# Patient Record
Sex: FEMALE | Race: WHITE | NOT HISPANIC OR LATINO | URBAN - METROPOLITAN AREA
[De-identification: names, ages, dates, MRNs, and addresses within clinical notes are randomized per-mention and may not be internally consistent; named-entity substitution may affect disease eponyms.]

---

## 2023-06-07 ENCOUNTER — TELEMEDICINE (OUTPATIENT)
Dept: PRIMARY CARE | Facility: CLINIC | Age: 48
End: 2023-06-07
Payer: COMMERCIAL

## 2023-06-07 ENCOUNTER — TELEPHONE (OUTPATIENT)
Dept: PRIMARY CARE | Facility: CLINIC | Age: 48
End: 2023-06-07

## 2023-06-07 DIAGNOSIS — J01.90 ACUTE SINUSITIS, RECURRENCE NOT SPECIFIED, UNSPECIFIED LOCATION: Primary | ICD-10-CM

## 2023-06-07 DIAGNOSIS — I10 HYPERTENSION, UNSPECIFIED TYPE: ICD-10-CM

## 2023-06-07 PROCEDURE — 99213 OFFICE O/P EST LOW 20 MIN: CPT | Performed by: FAMILY MEDICINE

## 2023-06-07 RX ORDER — MONTELUKAST SODIUM 10 MG/1
10 TABLET ORAL NIGHTLY
COMMUNITY
End: 2023-06-26 | Stop reason: SDUPTHER

## 2023-06-07 RX ORDER — ACYCLOVIR 400 MG/1
400 TABLET ORAL 2 TIMES DAILY
COMMUNITY
End: 2024-04-30 | Stop reason: SDUPTHER

## 2023-06-07 RX ORDER — OLMESARTAN MEDOXOMIL 20 MG/1
20 TABLET ORAL DAILY
COMMUNITY
End: 2023-06-07 | Stop reason: SDUPTHER

## 2023-06-07 RX ORDER — METFORMIN HYDROCHLORIDE 500 MG/1
500 TABLET ORAL
COMMUNITY
End: 2023-06-26 | Stop reason: SDUPTHER

## 2023-06-07 RX ORDER — AZITHROMYCIN 250 MG/1
TABLET, FILM COATED ORAL
Qty: 6 TABLET | Refills: 0 | Status: SHIPPED | OUTPATIENT
Start: 2023-06-07 | End: 2023-06-26 | Stop reason: ALTCHOICE

## 2023-06-07 RX ORDER — OLMESARTAN MEDOXOMIL 20 MG/1
20 TABLET ORAL DAILY
Qty: 30 TABLET | Refills: 0 | Status: SHIPPED | OUTPATIENT
Start: 2023-06-07 | End: 2023-06-26 | Stop reason: SDUPTHER

## 2023-06-07 RX ORDER — OMEPRAZOLE 40 MG/1
1 CAPSULE, DELAYED RELEASE ORAL DAILY
COMMUNITY
Start: 2022-07-11 | End: 2024-04-30 | Stop reason: SDUPTHER

## 2023-06-07 RX ORDER — GEMFIBROZIL 600 MG/1
600 TABLET, FILM COATED ORAL 2 TIMES DAILY
COMMUNITY
End: 2023-06-26 | Stop reason: SDUPTHER

## 2023-06-07 ASSESSMENT — ENCOUNTER SYMPTOMS
HEMOPTYSIS: 0
MYALGIAS: 0
FEVER: 0
SWEATS: 0
WHEEZING: 1
HEADACHES: 1
WEIGHT LOSS: 0
RHINORRHEA: 1
CHILLS: 0
COUGH: 1
SHORTNESS OF BREATH: 0
SORE THROAT: 1
HEARTBURN: 0

## 2023-06-07 NOTE — TELEPHONE ENCOUNTER
Please contact pt, she will need an in office appt for her DM and HTN, anytime after 10 days from now due to illness.    Please also let pt know that Diflucan and a Zpak are not recc together. Should she develop a yeast infection, I would recc OTC Monistat 3 day treatment,    Thank you,  Guillermina Vu, DO

## 2023-06-07 NOTE — PROGRESS NOTES
Subjective   Patient ID: Annabel Quiñonez is a 47 y.o. female who presents for No chief complaint on file..    Virtual or Telephone Consent    An interactive audio and video telecommunication system which permits real time communications between the patient (at the originating site) and provider (at the distant site) was utilized to provide this telehealth service.   Verbal consent was requested and obtained from Annabel Quiñonez on this date, 06/07/23 for a telehealth visit.       Cough  The current episode started in the past 7 days. The problem has been gradually worsening. The problem occurs every few minutes. The cough is Productive of sputum. Associated symptoms include ear congestion, headaches, nasal congestion, postnasal drip, rhinorrhea, a sore throat and wheezing. Pertinent negatives include no chest pain, chills, ear pain, fever, heartburn, hemoptysis, myalgias, rash, shortness of breath, sweats or weight loss. The symptoms are aggravated by lying down.        Review of Systems   Constitutional:  Negative for chills, fever and weight loss.   HENT:  Positive for postnasal drip, rhinorrhea and sore throat. Negative for ear pain.    Respiratory:  Positive for cough and wheezing. Negative for hemoptysis and shortness of breath.    Cardiovascular:  Negative for chest pain.   Gastrointestinal:  Negative for heartburn.   Musculoskeletal:  Negative for myalgias.   Skin:  Negative for rash.   Neurological:  Positive for headaches.       Objective   There were no vitals taken for this visit.    Physical Exam  Constitutional:       Appearance: Normal appearance.   HENT:      Nose: Congestion and rhinorrhea present.   Pulmonary:      Effort: Pulmonary effort is normal.      Breath sounds: Normal breath sounds.   Neurological:      Mental Status: She is alert.       Visit duration: 12 min   Reviewed and updated medication list and allergies   Assessment/Plan   Diagnoses and all orders for this visit:  Acute sinusitis,  recurrence not specified, unspecified location  -     azithromycin (Zithromax) 250 mg tablet; Take 2 tabs (500 mg) by mouth today, than 1 daily for 4 days.  Hypertension, unspecified type  -     olmesartan (BENIcar) 20 mg tablet; Take 1 tablet (20 mg) by mouth once daily.    Discussed R/B/SE of antibiotics  Recc OTC Yeast infection med, Monistat 3 day with symptoms  Recc continued use of HBP symptomatic cough and cold meds  Advised pt to call with any new or worsening symptoms    Guillermina MARISELA Vu,

## 2023-06-23 PROBLEM — E78.5 HYPERLIPIDEMIA: Status: ACTIVE | Noted: 2023-06-23

## 2023-06-23 PROBLEM — J45.909 ASTHMA (HHS-HCC): Status: ACTIVE | Noted: 2023-06-23

## 2023-06-23 PROBLEM — A60.00 GENITAL HERPES: Status: ACTIVE | Noted: 2023-06-23

## 2023-06-23 PROBLEM — Z90.721 STATUS POST HYSTERECTOMY WITH OOPHORECTOMY: Status: ACTIVE | Noted: 2023-06-23

## 2023-06-23 PROBLEM — I10 BENIGN ESSENTIAL HYPERTENSION: Chronic | Status: ACTIVE | Noted: 2023-06-23

## 2023-06-23 PROBLEM — K66.0 INTRAPERITONEAL ADHESIONS: Status: ACTIVE | Noted: 2023-06-23

## 2023-06-23 PROBLEM — G47.00 INSOMNIA: Status: ACTIVE | Noted: 2023-06-23

## 2023-06-23 PROBLEM — Z90.710 STATUS POST HYSTERECTOMY WITH OOPHORECTOMY: Status: ACTIVE | Noted: 2023-06-23

## 2023-06-23 PROBLEM — J10.1 INFLUENZA DUE TO INFLUENZA A VIRUS: Status: ACTIVE | Noted: 2023-06-23

## 2023-06-23 PROBLEM — K21.9 GERD (GASTROESOPHAGEAL REFLUX DISEASE): Status: ACTIVE | Noted: 2023-06-23

## 2023-06-23 PROBLEM — E11.9 CONTROLLED DIABETES MELLITUS (MULTI): Status: ACTIVE | Noted: 2023-06-23

## 2023-06-26 ENCOUNTER — OFFICE VISIT (OUTPATIENT)
Dept: PRIMARY CARE | Facility: CLINIC | Age: 48
End: 2023-06-26
Payer: COMMERCIAL

## 2023-06-26 ENCOUNTER — LAB (OUTPATIENT)
Dept: LAB | Facility: LAB | Age: 48
End: 2023-06-26
Payer: COMMERCIAL

## 2023-06-26 VITALS — BODY MASS INDEX: 34.3 KG/M2 | SYSTOLIC BLOOD PRESSURE: 126 MMHG | WEIGHT: 219 LBS | DIASTOLIC BLOOD PRESSURE: 80 MMHG

## 2023-06-26 DIAGNOSIS — J45.20 MILD INTERMITTENT ASTHMA, UNSPECIFIED WHETHER COMPLICATED (HHS-HCC): ICD-10-CM

## 2023-06-26 DIAGNOSIS — E78.1 HYPERTRIGLYCERIDEMIA: Chronic | ICD-10-CM

## 2023-06-26 DIAGNOSIS — E11.9 CONTROLLED TYPE 2 DIABETES MELLITUS WITHOUT COMPLICATION, WITHOUT LONG-TERM CURRENT USE OF INSULIN (MULTI): Primary | ICD-10-CM

## 2023-06-26 DIAGNOSIS — I10 HYPERTENSION, UNSPECIFIED TYPE: ICD-10-CM

## 2023-06-26 DIAGNOSIS — E11.9 CONTROLLED TYPE 2 DIABETES MELLITUS WITHOUT COMPLICATION, WITHOUT LONG-TERM CURRENT USE OF INSULIN (MULTI): ICD-10-CM

## 2023-06-26 DIAGNOSIS — Z12.11 COLON CANCER SCREENING: ICD-10-CM

## 2023-06-26 LAB
ALANINE AMINOTRANSFERASE (SGPT) (U/L) IN SER/PLAS: 22 U/L (ref 7–45)
ALBUMIN (G/DL) IN SER/PLAS: 4.4 G/DL (ref 3.4–5)
ALKALINE PHOSPHATASE (U/L) IN SER/PLAS: 37 U/L (ref 33–110)
ANION GAP IN SER/PLAS: 12 MMOL/L (ref 10–20)
ASPARTATE AMINOTRANSFERASE (SGOT) (U/L) IN SER/PLAS: 22 U/L (ref 9–39)
BILIRUBIN TOTAL (MG/DL) IN SER/PLAS: 0.5 MG/DL (ref 0–1.2)
CALCIUM (MG/DL) IN SER/PLAS: 9.4 MG/DL (ref 8.6–10.3)
CARBON DIOXIDE, TOTAL (MMOL/L) IN SER/PLAS: 27 MMOL/L (ref 21–32)
CHLORIDE (MMOL/L) IN SER/PLAS: 102 MMOL/L (ref 98–107)
CHOLESTEROL (MG/DL) IN SER/PLAS: 189 MG/DL (ref 0–199)
CHOLESTEROL IN HDL (MG/DL) IN SER/PLAS: 32.2 MG/DL
CHOLESTEROL/HDL RATIO: 5.9
CREATININE (MG/DL) IN SER/PLAS: 0.71 MG/DL (ref 0.5–1.05)
ESTIMATED AVERAGE GLUCOSE FOR HBA1C: 131 MG/DL
GFR FEMALE: >90 ML/MIN/1.73M2
GLUCOSE (MG/DL) IN SER/PLAS: 97 MG/DL (ref 74–99)
HEMOGLOBIN A1C/HEMOGLOBIN TOTAL IN BLOOD: 6.2 %
LDL: ABNORMAL MG/DL (ref 0–99)
POTASSIUM (MMOL/L) IN SER/PLAS: 4.3 MMOL/L (ref 3.5–5.3)
PROTEIN TOTAL: 7.4 G/DL (ref 6.4–8.2)
SODIUM (MMOL/L) IN SER/PLAS: 137 MMOL/L (ref 136–145)
TRIGLYCERIDE (MG/DL) IN SER/PLAS: 660 MG/DL (ref 0–149)
UREA NITROGEN (MG/DL) IN SER/PLAS: 9 MG/DL (ref 6–23)
VLDL: ABNORMAL MG/DL (ref 0–40)

## 2023-06-26 PROCEDURE — 80061 LIPID PANEL: CPT

## 2023-06-26 PROCEDURE — 83036 HEMOGLOBIN GLYCOSYLATED A1C: CPT

## 2023-06-26 PROCEDURE — 3074F SYST BP LT 130 MM HG: CPT | Performed by: FAMILY MEDICINE

## 2023-06-26 PROCEDURE — 99214 OFFICE O/P EST MOD 30 MIN: CPT | Performed by: FAMILY MEDICINE

## 2023-06-26 PROCEDURE — 36415 COLL VENOUS BLD VENIPUNCTURE: CPT

## 2023-06-26 PROCEDURE — 3079F DIAST BP 80-89 MM HG: CPT | Performed by: FAMILY MEDICINE

## 2023-06-26 PROCEDURE — 4010F ACE/ARB THERAPY RXD/TAKEN: CPT | Performed by: FAMILY MEDICINE

## 2023-06-26 PROCEDURE — 80053 COMPREHEN METABOLIC PANEL: CPT

## 2023-06-26 RX ORDER — GEMFIBROZIL 600 MG/1
600 TABLET, FILM COATED ORAL 2 TIMES DAILY
Qty: 60 TABLET | Refills: 11 | Status: SHIPPED | OUTPATIENT
Start: 2023-06-26 | End: 2024-04-30 | Stop reason: SDUPTHER

## 2023-06-26 RX ORDER — OLMESARTAN MEDOXOMIL 20 MG/1
20 TABLET ORAL DAILY
Qty: 90 TABLET | Refills: 3 | Status: SHIPPED | OUTPATIENT
Start: 2023-06-26 | End: 2024-04-30 | Stop reason: SDUPTHER

## 2023-06-26 RX ORDER — MONTELUKAST SODIUM 10 MG/1
10 TABLET ORAL NIGHTLY
Qty: 30 TABLET | Refills: 5 | Status: SHIPPED | OUTPATIENT
Start: 2023-06-26

## 2023-06-26 RX ORDER — METFORMIN HYDROCHLORIDE 500 MG/1
500 TABLET ORAL
Qty: 90 TABLET | Refills: 3 | Status: SHIPPED | OUTPATIENT
Start: 2023-06-26 | End: 2024-04-30 | Stop reason: SDUPTHER

## 2023-06-26 ASSESSMENT — ENCOUNTER SYMPTOMS
CONSTITUTIONAL NEGATIVE: 1
RESPIRATORY NEGATIVE: 1
MUSCULOSKELETAL NEGATIVE: 1
CARDIOVASCULAR NEGATIVE: 1

## 2023-06-26 ASSESSMENT — PATIENT HEALTH QUESTIONNAIRE - PHQ9
2. FEELING DOWN, DEPRESSED OR HOPELESS: NOT AT ALL
1. LITTLE INTEREST OR PLEASURE IN DOING THINGS: NOT AT ALL
SUM OF ALL RESPONSES TO PHQ9 QUESTIONS 1 AND 2: 0

## 2023-06-26 NOTE — PROGRESS NOTES
Subjective   Patient ID: Annabel Quiñonez is a 47 y.o. female who presents for Follow-up (Yearly follow up).    Pt presents for follow up:     DM  Is due for labs  Needs refills on meds  Grandmother has DM, Mother as well  She feels that she has gained weight   UTD on annual eye exams        HTN  BP is stable  Denies chest pain, pressure   No regular exercise    History of Bronchitis  On Singulair  No seasonal allergy symptoms    Kids are 13,16 and 21         Review of Systems   Constitutional: Negative.    Respiratory: Negative.     Cardiovascular: Negative.    Genitourinary: Negative.    Musculoskeletal: Negative.        Objective   /80   Wt 99.3 kg (219 lb)   BMI 34.30 kg/m²     Physical Exam  Constitutional:       Appearance: Normal appearance.   Cardiovascular:      Rate and Rhythm: Normal rate and regular rhythm.      Pulses:           Dorsalis pedis pulses are 2+ on the right side and 2+ on the left side.        Posterior tibial pulses are 2+ on the right side and 2+ on the left side.      Heart sounds: Normal heart sounds.   Pulmonary:      Effort: Pulmonary effort is normal.      Breath sounds: Normal breath sounds.   Abdominal:      General: Abdomen is flat. Bowel sounds are normal.      Palpations: Abdomen is soft.   Feet:      Right foot:      Protective Sensation: 5 sites tested.  5 sites sensed.      Skin integrity: Skin integrity normal.      Toenail Condition: Right toenails are normal.      Left foot:      Protective Sensation: 5 sites tested.  5 sites sensed.      Skin integrity: Skin integrity normal.      Toenail Condition: Left toenails are normal.   Neurological:      Mental Status: She is alert.         Assessment/Plan   Diagnoses and all orders for this visit:  Controlled type 2 diabetes mellitus without complication, without long-term current use of insulin (CMS/[facility])  -     Comprehensive metabolic panel; Future  -     Hemoglobin A1c; Future  -     metFORMIN (Glucophage) 500 mg tablet;  Take 1 tablet (500 mg) by mouth once daily with a meal. Take with food.  -     Lipid Panel; Future  Hypertension, unspecified type  -     olmesartan (BENIcar) 20 mg tablet; Take 1 tablet (20 mg) by mouth once daily.  Hypertriglyceridemia  -     gemfibrozil (Lopid) 600 mg tablet; Take 1 tablet (600 mg) by mouth 2 times a day.  Mild intermittent asthma, unspecified whether complicated  -     montelukast (Singulair) 10 mg tablet; Take 1 tablet (10 mg) by mouth once daily at bedtime.  Colon cancer screening  -     Referral to Gastroenterology; Future      Pt follows with GYN  Discussed the importance of a healthy diet, we discussed the possibility of nutrition referral  Discussed the importance of a regular exercise regimen, pt currently does not exercise   Follow up pending lab results  Guillermina Vu, DO

## 2023-06-27 LAB — CHOLESTEROL IN LDL (MG/DL) IN SER/PLAS BY DIRECT ASSAY: 63 MG/DL (ref 0–129)

## 2023-06-27 PROCEDURE — 83721 ASSAY OF BLOOD LIPOPROTEIN: CPT

## 2023-06-29 ENCOUNTER — TELEPHONE (OUTPATIENT)
Dept: PRIMARY CARE | Facility: CLINIC | Age: 48
End: 2023-06-29
Payer: COMMERCIAL

## 2023-06-29 DIAGNOSIS — E78.1 HYPERTRIGLYCERIDEMIA: Primary | Chronic | ICD-10-CM

## 2023-06-29 NOTE — TELEPHONE ENCOUNTER
Please notify pt that her 3 month blood sugar average is at 6.2, but her triglycerides are very elevated at 660. Please verify if she has been taking her Gemfibrozil?    Thank you,  Guillermina Vu, DO

## 2023-06-29 NOTE — TELEPHONE ENCOUNTER
Discussed with patient. Patient stated that she does not take them everyday but she does still take it. Patient stated that last year they were at 1400.

## 2024-01-13 DIAGNOSIS — J45.20 MILD INTERMITTENT ASTHMA, UNSPECIFIED WHETHER COMPLICATED (HHS-HCC): ICD-10-CM

## 2024-01-15 RX ORDER — MONTELUKAST SODIUM 10 MG/1
10 TABLET ORAL NIGHTLY
Qty: 30 TABLET | Refills: 0 | OUTPATIENT
Start: 2024-01-15

## 2024-04-30 ENCOUNTER — OFFICE VISIT (OUTPATIENT)
Dept: PRIMARY CARE | Facility: CLINIC | Age: 49
End: 2024-04-30
Payer: COMMERCIAL

## 2024-04-30 VITALS
SYSTOLIC BLOOD PRESSURE: 140 MMHG | DIASTOLIC BLOOD PRESSURE: 80 MMHG | WEIGHT: 217 LBS | BODY MASS INDEX: 33.99 KG/M2 | TEMPERATURE: 98.6 F

## 2024-04-30 DIAGNOSIS — A60.00 GENITAL HERPES SIMPLEX, UNSPECIFIED SITE: ICD-10-CM

## 2024-04-30 DIAGNOSIS — K21.9 GASTROESOPHAGEAL REFLUX DISEASE, UNSPECIFIED WHETHER ESOPHAGITIS PRESENT: ICD-10-CM

## 2024-04-30 DIAGNOSIS — R59.0 LAD (LYMPHADENOPATHY) OF LEFT CERVICAL REGION: ICD-10-CM

## 2024-04-30 DIAGNOSIS — Z12.31 ENCOUNTER FOR SCREENING MAMMOGRAM FOR MALIGNANT NEOPLASM OF BREAST: Primary | ICD-10-CM

## 2024-04-30 DIAGNOSIS — E78.1 HYPERTRIGLYCERIDEMIA: Chronic | ICD-10-CM

## 2024-04-30 DIAGNOSIS — I10 HYPERTENSION, UNSPECIFIED TYPE: ICD-10-CM

## 2024-04-30 DIAGNOSIS — E11.9 CONTROLLED TYPE 2 DIABETES MELLITUS WITHOUT COMPLICATION, WITHOUT LONG-TERM CURRENT USE OF INSULIN (MULTI): ICD-10-CM

## 2024-04-30 PROCEDURE — 3077F SYST BP >= 140 MM HG: CPT | Performed by: FAMILY MEDICINE

## 2024-04-30 PROCEDURE — 4010F ACE/ARB THERAPY RXD/TAKEN: CPT | Performed by: FAMILY MEDICINE

## 2024-04-30 PROCEDURE — 99213 OFFICE O/P EST LOW 20 MIN: CPT | Performed by: FAMILY MEDICINE

## 2024-04-30 PROCEDURE — 1036F TOBACCO NON-USER: CPT | Performed by: FAMILY MEDICINE

## 2024-04-30 PROCEDURE — 3079F DIAST BP 80-89 MM HG: CPT | Performed by: FAMILY MEDICINE

## 2024-04-30 RX ORDER — METFORMIN HYDROCHLORIDE 500 MG/1
500 TABLET ORAL
Qty: 90 TABLET | Refills: 3 | Status: SHIPPED | OUTPATIENT
Start: 2024-04-30

## 2024-04-30 RX ORDER — GEMFIBROZIL 600 MG/1
600 TABLET, FILM COATED ORAL 2 TIMES DAILY
Qty: 60 TABLET | Refills: 3 | Status: SHIPPED | OUTPATIENT
Start: 2024-04-30

## 2024-04-30 RX ORDER — ACYCLOVIR 400 MG/1
400 TABLET ORAL 2 TIMES DAILY
Qty: 30 TABLET | Refills: 0 | Status: SHIPPED | OUTPATIENT
Start: 2024-04-30

## 2024-04-30 RX ORDER — OLMESARTAN MEDOXOMIL 20 MG/1
20 TABLET ORAL DAILY
Qty: 90 TABLET | Refills: 3 | Status: SHIPPED | OUTPATIENT
Start: 2024-04-30

## 2024-04-30 RX ORDER — OMEPRAZOLE 40 MG/1
40 CAPSULE, DELAYED RELEASE ORAL DAILY
Qty: 30 CAPSULE | Refills: 3 | Status: SHIPPED | OUTPATIENT
Start: 2024-04-30

## 2024-04-30 ASSESSMENT — ENCOUNTER SYMPTOMS
RESPIRATORY NEGATIVE: 1
CONSTITUTIONAL NEGATIVE: 1
CARDIOVASCULAR NEGATIVE: 1
GASTROINTESTINAL NEGATIVE: 1

## 2024-04-30 NOTE — PROGRESS NOTES
Subjective   Patient ID: Leigh Ann Quiñonez is a 48 y.o. female who presents for Med Refill.    Pt presents for med refills    1) left anterior neck  Has noted an enlarged lymph node    2) DM  Needs med refills    3) HTN  Needs med refills    4) genital herpes  Needs med refills    5) GERD  PRN Prilosec, needs med refill           Review of Systems   Constitutional: Negative.    Respiratory: Negative.     Cardiovascular: Negative.    Gastrointestinal: Negative.        Objective   /80 (BP Location: Right arm, Patient Position: Sitting)   Temp 37 °C (98.6 °F)   Wt 98.4 kg (217 lb)   BMI 33.99 kg/m²     Physical Exam  Vitals reviewed.   Constitutional:       Appearance: Normal appearance.   Neck:        Comments: Left anterior single cervical node, noted to be enlarged, non tender to palpation  Cardiovascular:      Rate and Rhythm: Normal rate and regular rhythm.      Heart sounds: Normal heart sounds.   Pulmonary:      Breath sounds: Normal breath sounds.   Abdominal:      General: Bowel sounds are normal. There is no distension.      Palpations: Abdomen is soft. There is no mass.      Tenderness: There is no abdominal tenderness. There is no guarding.      Hernia: No hernia is present.   Musculoskeletal:      Right lower leg: No edema.      Left lower leg: No edema.   Skin:     General: Skin is warm and dry.   Neurological:      Mental Status: She is alert.         Assessment/Plan   Diagnoses and all orders for this visit:  Encounter for screening mammogram for malignant neoplasm of breast  -     BI mammo bilateral screening tomosynthesis; Future  Hypertension, unspecified type  -     olmesartan (BENIcar) 20 mg tablet; Take 1 tablet (20 mg) by mouth once daily.  Controlled type 2 diabetes mellitus without complication, without long-term current use of insulin ([facility])  -     metFORMIN (Glucophage) 500 mg tablet; Take 1 tablet (500 mg) by mouth once daily with breakfast. Take with food.  -     Hemoglobin A1c;  Future  -     Comprehensive metabolic panel; Future  Hypertriglyceridemia  -     gemfibrozil (Lopid) 600 mg tablet; Take 1 tablet (600 mg) by mouth 2 times a day.  Genital herpes simplex, unspecified site  -     acyclovir (Zovirax) 400 mg tablet; Take 1 tablet (400 mg) by mouth 2 times a day.  LAD (lymphadenopathy) of left cervical region  -     Referral to ENT; Future  Gastroesophageal reflux disease, unspecified whether esophagitis present  -     omeprazole (PriLOSEC) 40 mg DR capsule; Take 1 capsule (40 mg) by mouth once daily.      Track BP at home, if greater than 140/90 please call the office  Monitor enlarged lymph node, if not decreasing, recc ENT evaluation  Guillermina Vu, DO

## 2024-05-13 ENCOUNTER — APPOINTMENT (OUTPATIENT)
Dept: PRIMARY CARE | Facility: CLINIC | Age: 49
End: 2024-05-13
Payer: COMMERCIAL

## 2024-05-14 ENCOUNTER — OFFICE VISIT (OUTPATIENT)
Dept: OTOLARYNGOLOGY | Facility: HOSPITAL | Age: 49
End: 2024-05-14
Payer: COMMERCIAL

## 2024-05-14 VITALS — WEIGHT: 213.9 LBS | TEMPERATURE: 95.5 F | HEIGHT: 67 IN | BODY MASS INDEX: 33.57 KG/M2

## 2024-05-14 DIAGNOSIS — R59.0 LAD (LYMPHADENOPATHY) OF LEFT CERVICAL REGION: ICD-10-CM

## 2024-05-14 PROCEDURE — 99214 OFFICE O/P EST MOD 30 MIN: CPT | Performed by: STUDENT IN AN ORGANIZED HEALTH CARE EDUCATION/TRAINING PROGRAM

## 2024-05-14 PROCEDURE — 4010F ACE/ARB THERAPY RXD/TAKEN: CPT | Performed by: STUDENT IN AN ORGANIZED HEALTH CARE EDUCATION/TRAINING PROGRAM

## 2024-05-14 PROCEDURE — 1036F TOBACCO NON-USER: CPT | Performed by: STUDENT IN AN ORGANIZED HEALTH CARE EDUCATION/TRAINING PROGRAM

## 2024-05-14 PROCEDURE — 99204 OFFICE O/P NEW MOD 45 MIN: CPT | Performed by: STUDENT IN AN ORGANIZED HEALTH CARE EDUCATION/TRAINING PROGRAM

## 2024-05-14 ASSESSMENT — PATIENT HEALTH QUESTIONNAIRE - PHQ9
2. FEELING DOWN, DEPRESSED OR HOPELESS: NOT AT ALL
1. LITTLE INTEREST OR PLEASURE IN DOING THINGS: NOT AT ALL
SUM OF ALL RESPONSES TO PHQ9 QUESTIONS 1 & 2: 0

## 2024-05-14 NOTE — PROGRESS NOTES
"Chief Complaint:    Chief Complaint   Patient presents with    Follow-up     Mass on neck left side        History of Present Illness:  48 y.o. female presents to [facility] on 05/14/24 with left neck lymphadenopathy.  Around Easter time, the patient reports that she was feeling her neck and found a \"lump\" in the left lateral aspect of her neck.  She thought the other side and noted that this was not present on the right.  Otherwise, she is completely asymptomatic from the lesion.  She denies a history of salivary issues.  Of note, the patient does have a history of a skin cancer just along the radix of the nose.  It sounds like this was excised over 5 years ago.    Past Medical History  No past medical history on file.    Past Surgical History  No past surgical history on file.    Social History  Social History     Socioeconomic History    Marital status:      Spouse name: Not on file    Number of children: Not on file    Years of education: Not on file    Highest education level: Not on file   Occupational History    Not on file   Tobacco Use    Smoking status: Never     Passive exposure: Never    Smokeless tobacco: Never   Substance and Sexual Activity    Alcohol use: Not Currently    Drug use: Never    Sexual activity: Not on file   Other Topics Concern    Not on file   Social History Narrative    Not on file     Social Determinants of Health     Financial Resource Strain: Not on file   Food Insecurity: Not on file   Transportation Needs: Not on file   Physical Activity: Not on file   Stress: Not on file   Social Connections: Not on file   Intimate Partner Violence: Not on file   Housing Stability: Not on file       Family History  Family History   Problem Relation Name Age of Onset    No Known Problems Mother      No Known Problems Father         Medications:  Current Outpatient Medications   Medication Sig Dispense Refill    acyclovir (Zovirax) 400 mg tablet Take 1 " "tablet (400 mg) by mouth 2 times a day. 30 tablet 0    gemfibrozil (Lopid) 600 mg tablet Take 1 tablet (600 mg) by mouth 2 times a day. 60 tablet 3    metFORMIN (Glucophage) 500 mg tablet Take 1 tablet (500 mg) by mouth once daily with breakfast. Take with food. 90 tablet 3    montelukast (Singulair) 10 mg tablet Take 1 tablet (10 mg) by mouth once daily at bedtime. 30 tablet 5    olmesartan (BENIcar) 20 mg tablet Take 1 tablet (20 mg) by mouth once daily. 90 tablet 3    omeprazole (PriLOSEC) 40 mg DR capsule Take 1 capsule (40 mg) by mouth once daily. 30 capsule 3     No current facility-administered medications for this visit.       Allergies: Doxycycline, Hydromorphone, Oxycodone-acetaminophen, Penicillins, and Sulfa (sulfonamide antibiotics)    Immunizations:   Immunization History   Administered Date(s) Administered    Influenza, Unspecified 10/31/2011, 10/12/2012, 10/05/2015    Moderna COVID-19 vaccine, bivalent, blue cap/gray label *Check age/dose* 03/09/2023    Moderna SARS-CoV-2 Vaccination 03/15/2021, 04/14/2021, 11/26/2021        Review of Systems:  Constitutional: Negative for fever, weight loss and weight gain  HENT: Negative for ear pain, sore throat and hoarseness.  Negative for difficulty swallowing  Cardiovascular: Negative for chest pain and dyspnea on exertion (Can climb up 2 floors)  Respiratory: Is not experiencing shortness of breath  Gastrointestinal: Negative for nausea and vomiting  Neurological: Negative for headaches.   Psychiatric: The patient is not nervous/anxious   Musculoskeletal: Denies muscle pain/weakness  Heme/Lymph: Negative for lymph nodes, easy bruising    Physical Exam  Vital Signs:  Temp 35.3 °C (95.5 °F)   Ht 1.702 m (5' 7\")   Wt 97 kg (213 lb 14.4 oz)   BMI 33.50 kg/m²     General:  Well-developed, well-nourished. No distress.  Overweight female  Communication and Voice:  Clear pitch and clarity  Respiratory  Respiratory effort:  Equal inspiration and expiration without " stridor  Cardiovascular  Peripheral Vascular:  Warm extremities with equal pulses  Neuro: Patient oriented to person, place, and time;  Appropriate mood and affect;  Gait is intact with no imbalance; Cranial nerves II-XII are intact  Head and Face  Inspection:  Normocephalic and atraumatic without mass or lesion  Palpation:  Facial skeleton intact without bony stepoffs  Facial Strength:  Facial motility symmetric and full bilaterally  Eyes: PERRLA. No nystagmus with normal extraocular motion bilaterally  ENT  Pinna:  External ear intact and fully developed  External canal:  Canal is patent with intact skin  Tympanic Membrane:  Clear and mobile  External nose:  No scar or anatomic deformity  Internal Nose:  Septum intact and midline.  No edema, polyp, or rhinorrhea.  TMJ:  No pain to palpation with full mobility  Salivary Glands:  No mass or tenderness  Lips: No lesion.  Oral cavity: No mass or lesion.  Oropharynx:  No mass or lesion. Tonsillar fossa symmetric. Base of tongue soft without mass or induration  Neck  Trachea:  Midline trachea.  Thyroid:  No mass or nodularity  Lymphatics: I do feel an approximately 2 cm mass just inferior to the left submandibular gland.  This feels separate from the submandibular gland.  There is no overlying skin changes or pain with palpation     Impression/Plan:  48 y.o. female presents to [facility] on 05/14/24 with left neck lymphadenopathy.    -We will start by obtaining an ultrasound of the left lateral neck.  Based on ultrasound results, we may consider other imaging including a CT neck versus biopsy  -The patient does have a history of a skin cancer on the nose.  I do not know whether this was a basal cell or squamous cell.  -I will talk to the patient once ultrasound is done, ideally this will be back before my departure

## 2024-05-24 ENCOUNTER — TELEPHONE (OUTPATIENT)
Dept: PRIMARY CARE | Facility: CLINIC | Age: 49
End: 2024-05-24

## 2024-05-24 ENCOUNTER — LAB (OUTPATIENT)
Dept: LAB | Facility: LAB | Age: 49
End: 2024-05-24
Payer: COMMERCIAL

## 2024-05-24 ENCOUNTER — HOSPITAL ENCOUNTER (OUTPATIENT)
Dept: RADIOLOGY | Facility: CLINIC | Age: 49
Discharge: HOME | End: 2024-05-24
Payer: COMMERCIAL

## 2024-05-24 VITALS — BODY MASS INDEX: 33.56 KG/M2 | WEIGHT: 213.85 LBS | HEIGHT: 67 IN

## 2024-05-24 DIAGNOSIS — R59.0 LAD (LYMPHADENOPATHY) OF LEFT CERVICAL REGION: ICD-10-CM

## 2024-05-24 DIAGNOSIS — R92.8 ABNORMAL MAMMOGRAM: Primary | ICD-10-CM

## 2024-05-24 DIAGNOSIS — E78.1 HYPERTRIGLYCERIDEMIA: Chronic | ICD-10-CM

## 2024-05-24 DIAGNOSIS — Z12.31 ENCOUNTER FOR SCREENING MAMMOGRAM FOR MALIGNANT NEOPLASM OF BREAST: ICD-10-CM

## 2024-05-24 DIAGNOSIS — E11.9 CONTROLLED TYPE 2 DIABETES MELLITUS WITHOUT COMPLICATION, WITHOUT LONG-TERM CURRENT USE OF INSULIN (MULTI): ICD-10-CM

## 2024-05-24 LAB
ALBUMIN SERPL BCP-MCNC: 4.2 G/DL (ref 3.4–5)
ALP SERPL-CCNC: 42 U/L (ref 33–110)
ALT SERPL W P-5'-P-CCNC: 22 U/L (ref 7–45)
ANION GAP SERPL CALC-SCNC: 13 MMOL/L (ref 10–20)
AST SERPL W P-5'-P-CCNC: 18 U/L (ref 9–39)
BILIRUB SERPL-MCNC: 0.5 MG/DL (ref 0–1.2)
BUN SERPL-MCNC: 10 MG/DL (ref 6–23)
CALCIUM SERPL-MCNC: 9.2 MG/DL (ref 8.6–10.6)
CHLORIDE SERPL-SCNC: 104 MMOL/L (ref 98–107)
CHOLEST SERPL-MCNC: 221 MG/DL (ref 0–199)
CHOLESTEROL/HDL RATIO: 7.5
CO2 SERPL-SCNC: 25 MMOL/L (ref 21–32)
CREAT SERPL-MCNC: 0.82 MG/DL (ref 0.5–1.05)
EGFRCR SERPLBLD CKD-EPI 2021: 88 ML/MIN/1.73M*2
EST. AVERAGE GLUCOSE BLD GHB EST-MCNC: 126 MG/DL
GLUCOSE SERPL-MCNC: 112 MG/DL (ref 74–99)
HBA1C MFR BLD: 6 %
HDLC SERPL-MCNC: 29.5 MG/DL
LDLC SERPL CALC-MCNC: ABNORMAL MG/DL
NON HDL CHOLESTEROL: 192 MG/DL (ref 0–149)
POTASSIUM SERPL-SCNC: 4.2 MMOL/L (ref 3.5–5.3)
PROT SERPL-MCNC: 7.3 G/DL (ref 6.4–8.2)
SODIUM SERPL-SCNC: 138 MMOL/L (ref 136–145)
TRIGL SERPL-MCNC: 750 MG/DL (ref 0–149)
VLDL: ABNORMAL

## 2024-05-24 PROCEDURE — 77067 SCR MAMMO BI INCL CAD: CPT

## 2024-05-24 PROCEDURE — 80061 LIPID PANEL: CPT

## 2024-05-24 PROCEDURE — 77063 BREAST TOMOSYNTHESIS BI: CPT | Performed by: RADIOLOGY

## 2024-05-24 PROCEDURE — 76536 US EXAM OF HEAD AND NECK: CPT | Performed by: RADIOLOGY

## 2024-05-24 PROCEDURE — 76536 US EXAM OF HEAD AND NECK: CPT

## 2024-05-24 PROCEDURE — 83036 HEMOGLOBIN GLYCOSYLATED A1C: CPT

## 2024-05-24 PROCEDURE — 80053 COMPREHEN METABOLIC PANEL: CPT

## 2024-05-24 PROCEDURE — 36415 COLL VENOUS BLD VENIPUNCTURE: CPT

## 2024-05-24 PROCEDURE — 77067 SCR MAMMO BI INCL CAD: CPT | Performed by: RADIOLOGY

## 2024-05-24 NOTE — TELEPHONE ENCOUNTER
Pt is calling stating that she went to get her routine mammogram done today. Pt states that she found a lump yesterday and they still did her mammogram and stated that she would need further imaging done. Pt stated she just wanted to let you know for when you receive her results.

## 2024-05-30 ENCOUNTER — APPOINTMENT (OUTPATIENT)
Dept: OTOLARYNGOLOGY | Facility: CLINIC | Age: 49
End: 2024-05-30
Payer: COMMERCIAL

## 2024-05-31 ENCOUNTER — TELEPHONE (OUTPATIENT)
Dept: OTOLARYNGOLOGY | Facility: CLINIC | Age: 49
End: 2024-05-31
Payer: COMMERCIAL

## 2024-05-31 ENCOUNTER — TELEPHONE (OUTPATIENT)
Dept: OPERATING ROOM | Facility: CLINIC | Age: 49
End: 2024-05-31
Payer: COMMERCIAL

## 2024-05-31 DIAGNOSIS — R59.0 LAD (LYMPHADENOPATHY) OF LEFT CERVICAL REGION: ICD-10-CM

## 2024-05-31 NOTE — TELEPHONE ENCOUNTER
I called the patient about her recent US of her neck for her left neck mass showing:    FINDINGS:  There is a 1.2 x 0.3 x 1.0 cm lymph node in zone 2. No additional  space-occupying masses or fluid collections are detected.      IMPRESSION:  1.2 x 0.3 x 1.0 cm zone 2 lymph node at the site of palpable  abnormality.    Recall, the patient was seen for left neck lymphadenopathy. 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  I have low suspicion that this node is pathologic or related to her skin cancer.  We discussed options including observation with repeat ultrasound in 3 months versus an FNA of the lesion.  The patient is anxious about this lesion and would like to proceed with an FNA.  The lesion is quite small, but we will see whether IR will be able to biopsy this lesion.  Given my upcoming departure, I will have her follow-up with one of my head and neck partners.

## 2024-05-31 NOTE — TELEPHONE ENCOUNTER
Topic: Dr Singer request order of US biopsy of left neck node.    Order was placed and I call patient and gave her the phone number to schedule the biopsy. Pt instructed to call  me back and give me the date of the biopsy and then I will assist in scheduling an ENT HN appt to review the results.    Pt verbalized understanding

## 2024-06-03 ENCOUNTER — HOSPITAL ENCOUNTER (OUTPATIENT)
Dept: RADIOLOGY | Facility: EXTERNAL LOCATION | Age: 49
Discharge: HOME | End: 2024-06-03
Payer: COMMERCIAL

## 2024-06-03 NOTE — TELEPHONE ENCOUNTER
Pt needs further imaging, I placed an order for diag mammogram and US,    Thank you,  Guillermina Vu, DO

## 2024-06-04 ENCOUNTER — HOSPITAL ENCOUNTER (OUTPATIENT)
Dept: RADIOLOGY | Facility: CLINIC | Age: 49
Discharge: HOME | End: 2024-06-04
Payer: COMMERCIAL

## 2024-06-04 ENCOUNTER — TELEPHONE (OUTPATIENT)
Dept: OTOLARYNGOLOGY | Facility: HOSPITAL | Age: 49
End: 2024-06-04
Payer: COMMERCIAL

## 2024-06-04 DIAGNOSIS — R92.8 ABNORMAL MAMMOGRAM: ICD-10-CM

## 2024-06-04 DIAGNOSIS — R59.0 LAD (LYMPHADENOPATHY) OF LEFT CERVICAL REGION: ICD-10-CM

## 2024-06-04 PROCEDURE — 76642 ULTRASOUND BREAST LIMITED: CPT | Mod: RT

## 2024-06-04 PROCEDURE — 76981 USE PARENCHYMA: CPT | Mod: RT

## 2024-06-04 PROCEDURE — 76642 ULTRASOUND BREAST LIMITED: CPT | Mod: RIGHT SIDE | Performed by: RADIOLOGY

## 2024-06-04 NOTE — TELEPHONE ENCOUNTER
Topic: Cancel US FNA biopsy of neck nodule      Pt called stating that she had two options when talking with Dr Singer on 5/31. The discussion was to wait and repeat neck US in 3 months or biopsy the neck nodule at this time.  Pt requested to biopsy nodule and it was scheduled for 6/10, but she is calling stating she wants to cancel the biopsy and instead repeat the Neck US in 3 months.    Dr Richard is taking for from Dr Singer. His team is aware of Dr Singer's recommendation from 5/31 and pt's request now to cancel the biopsy.  Dr Richard's nurse has placed the order for US neck in 3 months and Dr Richard's  will call patient to schedule an appt for after the US.  I have canceled the patient's US FNA biopsy that was scheduled for 6/10.    I called pt back and she is aware of that I canceled the US FNA biopsy as she requested and that the order has been placed for the repeat US in 3 months.  Pt was given the phone number to schedule the US.  Pt was also given the phone number for Dr Richard's office to call with questions or concerns.  Pt verbalized understanding of the above

## 2024-06-10 ENCOUNTER — APPOINTMENT (OUTPATIENT)
Dept: RADIOLOGY | Facility: HOSPITAL | Age: 49
End: 2024-06-10
Payer: COMMERCIAL

## 2024-09-04 ENCOUNTER — APPOINTMENT (OUTPATIENT)
Dept: RADIOLOGY | Facility: CLINIC | Age: 49
End: 2024-09-04
Payer: COMMERCIAL

## 2024-09-10 ENCOUNTER — HOSPITAL ENCOUNTER (OUTPATIENT)
Dept: RADIOLOGY | Facility: CLINIC | Age: 49
Discharge: HOME | End: 2024-09-10
Payer: COMMERCIAL

## 2024-09-10 DIAGNOSIS — R59.0 LAD (LYMPHADENOPATHY) OF LEFT CERVICAL REGION: ICD-10-CM

## 2024-09-10 PROCEDURE — 76536 US EXAM OF HEAD AND NECK: CPT

## 2024-09-10 PROCEDURE — 76536 US EXAM OF HEAD AND NECK: CPT | Performed by: RADIOLOGY

## 2024-09-11 NOTE — RESULT ENCOUNTER NOTE
Ultrasound reviewed.  No obvious concerning lymph nodes.  Some are borderline enlarged but the overall shape of the lymph nodes are normal.    Vandana will review everything at your appointment later this month.  We can discuss whether anything else is indicated after obtaining more history and reviewing everything in person

## 2024-09-13 ENCOUNTER — APPOINTMENT (OUTPATIENT)
Dept: OTOLARYNGOLOGY | Facility: CLINIC | Age: 49
End: 2024-09-13
Payer: COMMERCIAL

## 2024-09-16 DIAGNOSIS — K21.9 GASTROESOPHAGEAL REFLUX DISEASE, UNSPECIFIED WHETHER ESOPHAGITIS PRESENT: ICD-10-CM

## 2024-09-16 DIAGNOSIS — E78.1 HYPERTRIGLYCERIDEMIA: Chronic | ICD-10-CM

## 2024-09-26 NOTE — PROGRESS NOTES
"Chief Complaint:    Chief Complaint   Patient presents with    Follow-up       History of Present Illness:  48 y.o. female presents to [facility] on 09/27/24 with left neck lymphadenopathy.  Around Easter time, the patient reports that she was feeling her neck and found a \"lump\" in the left lateral aspect of her neck.  She thought the other side and noted that this was not present on the right.  Otherwise, she is completely asymptomatic from the lesion.  She denies a history of salivary issues.  Of note, the patient does have a history of a skin cancer just along the radix of the nose.  It sounds like this was excised over 5 years ago.    9/27/24: Patient returns for follow-up.  She previously saw Dr. Singer.  She recently had a repeat ultrasound which showed the left-sided lymph node is slightly larger in size.  It maintains a fatty hilum.  She has remained asymptomatic    Past Medical History  History reviewed. No pertinent past medical history.    Past Surgical History  Past Surgical History:   Procedure Laterality Date    BREAST RECONSTRUCTION Bilateral 01/01/2000 2000 bilateral breast reduction       Social History  Social History     Socioeconomic History    Marital status:      Spouse name: Not on file    Number of children: Not on file    Years of education: Not on file    Highest education level: Not on file   Occupational History    Not on file   Tobacco Use    Smoking status: Never     Passive exposure: Never    Smokeless tobacco: Never   Substance and Sexual Activity    Alcohol use: Not Currently    Drug use: Never    Sexual activity: Not on file   Other Topics Concern    Not on file   Social History Narrative    Not on file     Social Determinants of Health     Financial Resource Strain: Not on file   Food Insecurity: Not on file   Transportation Needs: Not on file   Physical Activity: Not on file   Stress: Not on file   Social Connections: Not on file "   Intimate Partner Violence: Not on file   Housing Stability: Not on file       Family History  Family History   Problem Relation Name Age of Onset    No Known Problems Mother      No Known Problems Father         Medications:  Current Outpatient Medications   Medication Sig Dispense Refill    acyclovir (Zovirax) 400 mg tablet Take 1 tablet (400 mg) by mouth 2 times a day. 30 tablet 0    gemfibrozil (Lopid) 600 mg tablet Take 1 tablet (600 mg) by mouth 2 times a day. 60 tablet 3    metFORMIN (Glucophage) 500 mg tablet Take 1 tablet (500 mg) by mouth once daily with breakfast. Take with food. 90 tablet 3    montelukast (Singulair) 10 mg tablet Take 1 tablet (10 mg) by mouth once daily at bedtime. 30 tablet 5    olmesartan (BENIcar) 20 mg tablet Take 1 tablet (20 mg) by mouth once daily. 90 tablet 3    omeprazole (PriLOSEC) 40 mg DR capsule Take 1 capsule (40 mg) by mouth once daily. 30 capsule 3     No current facility-administered medications for this visit.       Allergies: Doxycycline, Hydromorphone, Oxycodone-acetaminophen, Penicillins, and Sulfa (sulfonamide antibiotics)    Immunizations:   Immunization History   Administered Date(s) Administered    Influenza, Unspecified 10/31/2011, 10/12/2012, 10/05/2015    Moderna COVID-19 vaccine, bivalent, blue cap/gray label *Check age/dose* 03/09/2023    Moderna SARS-CoV-2 Vaccination 03/15/2021, 04/14/2021, 11/26/2021        Review of Systems:  Constitutional: Negative for fever, weight loss and weight gain  HENT: Negative for ear pain, sore throat and hoarseness.  Negative for difficulty swallowing  Cardiovascular: Negative for chest pain and dyspnea on exertion (Can climb up 2 floors)  Respiratory: Is not experiencing shortness of breath  Gastrointestinal: Negative for nausea and vomiting  Neurological: Negative for headaches.   Psychiatric: The patient is not nervous/anxious   Musculoskeletal: Denies muscle pain/weakness  Heme/Lymph: Negative for lymph nodes, easy  "bruising    Physical Exam  Vital Signs:  Ht 1.702 m (5' 7\")   Wt 96.2 kg (212 lb)   BMI 33.20 kg/m²     General:  Well-developed, well-nourished. No distress.  Overweight female  Communication and Voice:  Clear pitch and clarity  Respiratory  Respiratory effort:  Equal inspiration and expiration without stridor  Cardiovascular  Peripheral Vascular:  Warm extremities with equal pulses  Neuro: Patient oriented to person, place, and time;  Appropriate mood and affect;  Gait is intact with no imbalance; Cranial nerves II-XII are intact  Head and Face  Inspection:  Normocephalic and atraumatic without mass or lesion  Palpation:  Facial skeleton intact without bony stepoffs  Facial Strength:  Facial motility symmetric and full bilaterally  Eyes: PERRLA. No nystagmus with normal extraocular motion bilaterally  ENT  Pinna:  External ear intact and fully developed  External canal:  Canal is patent with intact skin  Tympanic Membrane:  Clear and mobile  External nose:  No scar or anatomic deformity  Internal Nose:  Septum intact and midline.  No edema, polyp, or rhinorrhea.  TMJ:  No pain to palpation with full mobility  Salivary Glands:  No mass or tenderness  Lips: No lesion.  Oral cavity: No mass or lesion.  Oropharynx:  No mass or lesion. Tonsillar fossa symmetric. Base of tongue soft without mass or induration  Neck  Trachea:  Midline trachea.  Thyroid:  No mass or nodularity  Lymphatics: I do feel an approximately 2 cm mass just inferior to the left submandibular gland.  This feels separate from the submandibular gland.  There is no overlying skin changes or pain with palpation     Procedure Note: Flexible Nasolaryngoscopy  Verbal informed consent was obtained from the patient/patient's guardian. 4% lidocaine mixed with phenylephrine was prepared and dripped into the nose. It was placed in the right naris. Following an appropriate amount of time to allow for adequate anesthesia, a flexible fiberoptic nasolaryngoscope " was placed into the patient's right naris. The nasal cavity, nasopharynx, oropharynx, hypopharynx, and all endolaryngeal structures were visualized and were normal except as listed below. Significant findings included:  -No concerning mucosal lesions  True vocal cord mobile      Impression/Plan:  48 y.o. female presents to [facility] on 05/14/24 with left neck lymphadenopathy.    -We reviewed her ultrasound in detail.  The lymph node is still present and is measured slightly larger in size.  Considering it has not resolved and is slightly grown in size would recommend image guided biopsy for lymphoma protocol.  -Follow-up after biopsy, follow-up earlier with any new concerns

## 2024-09-27 ENCOUNTER — APPOINTMENT (OUTPATIENT)
Dept: OTOLARYNGOLOGY | Facility: CLINIC | Age: 49
End: 2024-09-27
Payer: COMMERCIAL

## 2024-09-27 VITALS — BODY MASS INDEX: 33.27 KG/M2 | HEIGHT: 67 IN | WEIGHT: 212 LBS

## 2024-09-27 DIAGNOSIS — R59.0 LAD (LYMPHADENOPATHY) OF LEFT CERVICAL REGION: ICD-10-CM

## 2024-09-27 PROCEDURE — 3008F BODY MASS INDEX DOCD: CPT | Performed by: OTOLARYNGOLOGY

## 2024-09-27 PROCEDURE — 3044F HG A1C LEVEL LT 7.0%: CPT | Performed by: OTOLARYNGOLOGY

## 2024-09-27 PROCEDURE — 4010F ACE/ARB THERAPY RXD/TAKEN: CPT | Performed by: OTOLARYNGOLOGY

## 2024-09-27 PROCEDURE — 1036F TOBACCO NON-USER: CPT | Performed by: OTOLARYNGOLOGY

## 2024-09-27 PROCEDURE — 99213 OFFICE O/P EST LOW 20 MIN: CPT | Performed by: OTOLARYNGOLOGY

## 2024-09-27 ASSESSMENT — PAIN SCALES - GENERAL: PAINLEVEL: 0-NO PAIN

## 2024-10-08 RX ORDER — OMEPRAZOLE 40 MG/1
40 CAPSULE, DELAYED RELEASE ORAL DAILY
Qty: 30 CAPSULE | Refills: 0 | OUTPATIENT
Start: 2024-10-08

## 2024-10-08 RX ORDER — GEMFIBROZIL 600 MG/1
600 TABLET, FILM COATED ORAL 2 TIMES DAILY
Qty: 60 TABLET | Refills: 0 | OUTPATIENT
Start: 2024-10-08

## 2024-10-25 ENCOUNTER — HOSPITAL ENCOUNTER (OUTPATIENT)
Dept: RADIOLOGY | Facility: HOSPITAL | Age: 49
Discharge: HOME | End: 2024-10-25
Payer: COMMERCIAL

## 2024-10-25 VITALS
HEART RATE: 69 BPM | DIASTOLIC BLOOD PRESSURE: 92 MMHG | SYSTOLIC BLOOD PRESSURE: 151 MMHG | OXYGEN SATURATION: 96 % | RESPIRATION RATE: 20 BRPM

## 2024-10-25 DIAGNOSIS — R59.0 LAD (LYMPHADENOPATHY) OF LEFT CERVICAL REGION: ICD-10-CM

## 2024-10-25 PROCEDURE — 10005 FNA BX W/US GDN 1ST LES: CPT

## 2024-10-25 PROCEDURE — 2500000004 HC RX 250 GENERAL PHARMACY W/ HCPCS (ALT 636 FOR OP/ED): Performed by: RADIOLOGY

## 2024-10-25 RX ORDER — LIDOCAINE HYDROCHLORIDE 10 MG/ML
INJECTION, SOLUTION EPIDURAL; INFILTRATION; INTRACAUDAL; PERINEURAL
Status: COMPLETED | OUTPATIENT
Start: 2024-10-25 | End: 2024-10-25

## 2024-10-25 ASSESSMENT — PAIN SCALES - GENERAL
PAINLEVEL_OUTOF10: 0 - NO PAIN

## 2024-10-25 NOTE — Clinical Note
Biopsy site remains d/I with no bleeding or hematoma present. Pt denies pain,dysphagia or facialo numbness/tingling. Discharge instructions given. Verbalizes understanding. Discharged pt to waiting room.

## 2024-10-25 NOTE — Clinical Note
Left cervical lymph node biopsy complete. Samples sent to lab. Site without bleeding or hematoma. Dressed with band aid and ice pack. Pt tolerated well. Denies pain, dysphagia or facial numbness/tingling. Will cont to monitor.

## 2024-10-25 NOTE — POST-PROCEDURE NOTE
Interventional Radiology Brief Postprocedure Note    Attending: Zay Sher MD      Assistant: none    Diagnosis: Level 2 lymph node    Description of procedure: I evaluated the left neck for core biopsy, but the position of the lymph node relative to the carotid bifurcation and a thyroid vessel was concerning to me. Proceeded with FNA biopsy x 5 a sent according to the lymph node protocol as discussed with path.     Anesthesia:  Local    Complications: None    Estimated Blood Loss: minimal    Medications (Filter: Administrations occurring from 1003 to 1051 on 10/25/24) As of 10/25/24 1051      lidocaine PF (Xylocaine) 10 mg/mL (1 %) injection (mL) Total volume:  5 mL      Date/Time Rate/Dose/Volume Action       10/25/24  1028 5 mL Given                   See detailed result report with images in PACS.    The patient tolerated the procedure well without incident or complication and is in stable condition.

## 2024-10-30 ENCOUNTER — APPOINTMENT (OUTPATIENT)
Dept: PRIMARY CARE | Facility: CLINIC | Age: 49
End: 2024-10-30
Payer: COMMERCIAL

## 2024-10-30 VITALS
SYSTOLIC BLOOD PRESSURE: 142 MMHG | DIASTOLIC BLOOD PRESSURE: 90 MMHG | BODY MASS INDEX: 33.74 KG/M2 | OXYGEN SATURATION: 99 % | HEART RATE: 74 BPM | RESPIRATION RATE: 16 BRPM | TEMPERATURE: 97.6 F | HEIGHT: 67 IN | WEIGHT: 215 LBS

## 2024-10-30 DIAGNOSIS — E11.9 CONTROLLED TYPE 2 DIABETES MELLITUS WITHOUT COMPLICATION, WITHOUT LONG-TERM CURRENT USE OF INSULIN (MULTI): ICD-10-CM

## 2024-10-30 DIAGNOSIS — E78.1 HYPERTRIGLYCERIDEMIA: Chronic | ICD-10-CM

## 2024-10-30 DIAGNOSIS — I10 BENIGN ESSENTIAL HYPERTENSION: Chronic | ICD-10-CM

## 2024-10-30 DIAGNOSIS — J45.20 MILD INTERMITTENT REACTIVE AIRWAY DISEASE WITHOUT COMPLICATION (HHS-HCC): ICD-10-CM

## 2024-10-30 DIAGNOSIS — A60.00 GENITAL HERPES SIMPLEX, UNSPECIFIED SITE: ICD-10-CM

## 2024-10-30 DIAGNOSIS — R73.03 PREDIABETES: Primary | ICD-10-CM

## 2024-10-30 DIAGNOSIS — Z23 ENCOUNTER FOR IMMUNIZATION: ICD-10-CM

## 2024-10-30 DIAGNOSIS — Z12.9 SCREENING FOR MALIGNANT NEOPLASM: ICD-10-CM

## 2024-10-30 DIAGNOSIS — K92.1 HEMATOCHEZIA: ICD-10-CM

## 2024-10-30 DIAGNOSIS — Z90.710 STATUS POST HYSTERECTOMY WITH OOPHORECTOMY: ICD-10-CM

## 2024-10-30 DIAGNOSIS — J45.20 MILD INTERMITTENT ASTHMA, UNSPECIFIED WHETHER COMPLICATED (HHS-HCC): ICD-10-CM

## 2024-10-30 DIAGNOSIS — I10 HYPERTENSION, UNSPECIFIED TYPE: ICD-10-CM

## 2024-10-30 DIAGNOSIS — Z90.721 STATUS POST HYSTERECTOMY WITH OOPHORECTOMY: ICD-10-CM

## 2024-10-30 DIAGNOSIS — K21.9 GASTROESOPHAGEAL REFLUX DISEASE, UNSPECIFIED WHETHER ESOPHAGITIS PRESENT: ICD-10-CM

## 2024-10-30 LAB
BASOPHILS # BLD AUTO: 0.03 X10*3/UL (ref 0–0.1)
BASOPHILS NFR BLD AUTO: 0.5 %
CHOLEST SERPL-MCNC: 208 MG/DL (ref 0–199)
CHOLESTEROL/HDL RATIO: 6.7
EOSINOPHIL # BLD AUTO: 0.13 X10*3/UL (ref 0–0.7)
EOSINOPHIL NFR BLD AUTO: 2.1 %
ERYTHROCYTE [DISTWIDTH] IN BLOOD BY AUTOMATED COUNT: 12.8 % (ref 11.5–14.5)
EST. AVERAGE GLUCOSE BLD GHB EST-MCNC: 123 MG/DL
HBA1C MFR BLD: 5.9 %
HCT VFR BLD AUTO: 39.4 % (ref 36–46)
HDLC SERPL-MCNC: 30.9 MG/DL
HGB BLD-MCNC: 13 G/DL (ref 12–16)
IMM GRANULOCYTES # BLD AUTO: 0.01 X10*3/UL (ref 0–0.7)
IMM GRANULOCYTES NFR BLD AUTO: 0.2 % (ref 0–0.9)
LDLC SERPL CALC-MCNC: ABNORMAL MG/DL
LYMPHOCYTES # BLD AUTO: 1.94 X10*3/UL (ref 1.2–4.8)
LYMPHOCYTES NFR BLD AUTO: 31.4 %
MCH RBC QN AUTO: 29.4 PG (ref 26–34)
MCHC RBC AUTO-ENTMCNC: 33 G/DL (ref 32–36)
MCV RBC AUTO: 89 FL (ref 80–100)
MONOCYTES # BLD AUTO: 0.53 X10*3/UL (ref 0.1–1)
MONOCYTES NFR BLD AUTO: 8.6 %
NEUTROPHILS # BLD AUTO: 3.54 X10*3/UL (ref 1.2–7.7)
NEUTROPHILS NFR BLD AUTO: 57.2 %
NON HDL CHOLESTEROL: 177 MG/DL (ref 0–149)
NRBC BLD-RTO: 0 /100 WBCS (ref 0–0)
PLATELET # BLD AUTO: 274 X10*3/UL (ref 150–450)
RBC # BLD AUTO: 4.42 X10*6/UL (ref 4–5.2)
TRIGL SERPL-MCNC: 842 MG/DL (ref 0–149)
VLDL: ABNORMAL
WBC # BLD AUTO: 6.2 X10*3/UL (ref 4.4–11.3)

## 2024-10-30 PROCEDURE — 90471 IMMUNIZATION ADMIN: CPT

## 2024-10-30 PROCEDURE — 4010F ACE/ARB THERAPY RXD/TAKEN: CPT

## 2024-10-30 PROCEDURE — 99215 OFFICE O/P EST HI 40 MIN: CPT

## 2024-10-30 PROCEDURE — 1036F TOBACCO NON-USER: CPT

## 2024-10-30 PROCEDURE — 3044F HG A1C LEVEL LT 7.0%: CPT

## 2024-10-30 PROCEDURE — 90656 IIV3 VACC NO PRSV 0.5 ML IM: CPT

## 2024-10-30 PROCEDURE — 90715 TDAP VACCINE 7 YRS/> IM: CPT

## 2024-10-30 PROCEDURE — 3075F SYST BP GE 130 - 139MM HG: CPT

## 2024-10-30 PROCEDURE — 83036 HEMOGLOBIN GLYCOSYLATED A1C: CPT

## 2024-10-30 PROCEDURE — 3079F DIAST BP 80-89 MM HG: CPT

## 2024-10-30 PROCEDURE — 80061 LIPID PANEL: CPT

## 2024-10-30 PROCEDURE — 3008F BODY MASS INDEX DOCD: CPT

## 2024-10-30 PROCEDURE — 85025 COMPLETE CBC W/AUTO DIFF WBC: CPT

## 2024-10-30 PROCEDURE — 90472 IMMUNIZATION ADMIN EACH ADD: CPT

## 2024-10-30 RX ORDER — MONTELUKAST SODIUM 10 MG/1
10 TABLET ORAL NIGHTLY
Qty: 30 TABLET | Refills: 1 | Status: SHIPPED | OUTPATIENT
Start: 2024-10-30 | End: 2024-12-29

## 2024-10-30 RX ORDER — OLMESARTAN MEDOXOMIL 20 MG/1
20 TABLET ORAL DAILY
Qty: 90 TABLET | Refills: 1 | Status: SHIPPED | OUTPATIENT
Start: 2024-10-30

## 2024-10-30 RX ORDER — METFORMIN HYDROCHLORIDE 500 MG/1
500 TABLET ORAL
Qty: 90 TABLET | Refills: 1 | Status: SHIPPED | OUTPATIENT
Start: 2024-10-30

## 2024-10-30 RX ORDER — ACYCLOVIR 400 MG/1
400 TABLET ORAL 2 TIMES DAILY
Qty: 30 TABLET | Refills: 0 | Status: SHIPPED | OUTPATIENT
Start: 2024-10-30

## 2024-10-30 RX ORDER — OMEPRAZOLE 40 MG/1
40 CAPSULE, DELAYED RELEASE ORAL DAILY
Qty: 30 CAPSULE | Refills: 3 | Status: SHIPPED | OUTPATIENT
Start: 2024-10-30

## 2024-10-30 RX ORDER — AMLODIPINE BESYLATE 5 MG/1
5 TABLET ORAL DAILY
Qty: 30 TABLET | Refills: 0 | Status: SHIPPED | OUTPATIENT
Start: 2024-10-30 | End: 2024-11-29

## 2024-10-30 RX ORDER — GEMFIBROZIL 600 MG/1
600 TABLET, FILM COATED ORAL 2 TIMES DAILY
Qty: 90 TABLET | Refills: 1 | Status: SHIPPED | OUTPATIENT
Start: 2024-10-30 | End: 2025-04-28

## 2024-10-30 SDOH — ECONOMIC STABILITY: FOOD INSECURITY: WITHIN THE PAST 12 MONTHS, THE FOOD YOU BOUGHT JUST DIDN'T LAST AND YOU DIDN'T HAVE MONEY TO GET MORE.: NEVER TRUE

## 2024-10-30 SDOH — HEALTH STABILITY: PHYSICAL HEALTH: ON AVERAGE, HOW MANY MINUTES DO YOU ENGAGE IN EXERCISE AT THIS LEVEL?: 0 MIN

## 2024-10-30 SDOH — ECONOMIC STABILITY: FOOD INSECURITY: WITHIN THE PAST 12 MONTHS, YOU WORRIED THAT YOUR FOOD WOULD RUN OUT BEFORE YOU GOT MONEY TO BUY MORE.: NEVER TRUE

## 2024-10-30 SDOH — HEALTH STABILITY: PHYSICAL HEALTH: ON AVERAGE, HOW MANY DAYS PER WEEK DO YOU ENGAGE IN MODERATE TO STRENUOUS EXERCISE (LIKE A BRISK WALK)?: 0 DAYS

## 2024-10-30 SDOH — ECONOMIC STABILITY: GENERAL
WHICH OF THE FOLLOWING WOULD YOU LIKE TO GET CONNECTED TO IN ORDER TO RECEIVE A DISCOUNT OR FOR FREE? (CHOOSE ALL THAT APPLY): NONE OF THESE

## 2024-10-30 SDOH — ECONOMIC STABILITY: INCOME INSECURITY: IN THE LAST 12 MONTHS, WAS THERE A TIME WHEN YOU WERE NOT ABLE TO PAY THE MORTGAGE OR RENT ON TIME?: NO

## 2024-10-30 SDOH — ECONOMIC STABILITY: TRANSPORTATION INSECURITY
IN THE PAST 12 MONTHS, HAS LACK OF TRANSPORTATION KEPT YOU FROM MEETINGS, WORK, OR FROM GETTING THINGS NEEDED FOR DAILY LIVING?: NO

## 2024-10-30 SDOH — ECONOMIC STABILITY: TRANSPORTATION INSECURITY
IN THE PAST 12 MONTHS, HAS THE LACK OF TRANSPORTATION KEPT YOU FROM MEDICAL APPOINTMENTS OR FROM GETTING MEDICATIONS?: NO

## 2024-10-30 SDOH — ECONOMIC STABILITY: GENERAL
WHICH OF THE FOLLOWING DO YOU KNOW HOW TO USE AND HAVE ACCESS TO EVERY DAY? (CHOOSE ALL THAT APPLY): DESKTOP COMPUTER, LAPTOP COMPUTER, OR TABLET WITH BROADBAND INTERNET CONNECTION;SMARTPHONE WITH CELLULAR DATA PLAN

## 2024-10-30 ASSESSMENT — SOCIAL DETERMINANTS OF HEALTH (SDOH)
WITHIN THE LAST YEAR, HAVE YOU BEEN AFRAID OF YOUR PARTNER OR EX-PARTNER?: NO
HOW OFTEN DO YOU GET TOGETHER WITH FRIENDS OR RELATIVES?: ONCE A WEEK
HOW OFTEN DO YOU ATTENT MEETINGS OF THE CLUB OR ORGANIZATION YOU BELONG TO?: 1 TO 4 TIMES PER YEAR
DO YOU BELONG TO ANY CLUBS OR ORGANIZATIONS SUCH AS CHURCH GROUPS UNIONS, FRATERNAL OR ATHLETIC GROUPS, OR SCHOOL GROUPS?: NO
IN THE PAST 12 MONTHS, HAS THE ELECTRIC, GAS, OIL, OR WATER COMPANY THREATENED TO SHUT OFF SERVICE IN YOUR HOME?: NO
WITHIN THE LAST YEAR, HAVE TO BEEN RAPED OR FORCED TO HAVE ANY KIND OF SEXUAL ACTIVITY BY YOUR PARTNER OR EX-PARTNER?: NO
IN A TYPICAL WEEK, HOW MANY TIMES DO YOU TALK ON THE PHONE WITH FAMILY, FRIENDS, OR NEIGHBORS?: MORE THAN THREE TIMES A WEEK
HOW OFTEN DO YOU ATTEND CHURCH OR RELIGIOUS SERVICES?: 1 TO 4 TIMES PER YEAR
WITHIN THE LAST YEAR, HAVE YOU BEEN KICKED, HIT, SLAPPED, OR OTHERWISE PHYSICALLY HURT BY YOUR PARTNER OR EX-PARTNER?: NO
WITHIN THE LAST YEAR, HAVE YOU BEEN HUMILIATED OR EMOTIONALLY ABUSED IN OTHER WAYS BY YOUR PARTNER OR EX-PARTNER?: NO
HOW HARD IS IT FOR YOU TO PAY FOR THE VERY BASICS LIKE FOOD, HOUSING, MEDICAL CARE, AND HEATING?: NOT HARD AT ALL

## 2024-10-30 ASSESSMENT — LIFESTYLE VARIABLES
HOW OFTEN DO YOU HAVE SIX OR MORE DRINKS ON ONE OCCASION: NEVER
AUDIT-C TOTAL SCORE: 1
HOW OFTEN DO YOU HAVE A DRINK CONTAINING ALCOHOL: MONTHLY OR LESS
HOW MANY STANDARD DRINKS CONTAINING ALCOHOL DO YOU HAVE ON A TYPICAL DAY: 1 OR 2
SKIP TO QUESTIONS 9-10: 1

## 2024-10-30 ASSESSMENT — PATIENT HEALTH QUESTIONNAIRE - PHQ9
1. LITTLE INTEREST OR PLEASURE IN DOING THINGS: NOT AT ALL
2. FEELING DOWN, DEPRESSED OR HOPELESS: NOT AT ALL
SUM OF ALL RESPONSES TO PHQ9 QUESTIONS 1 & 2: 0

## 2024-10-30 ASSESSMENT — ENCOUNTER SYMPTOMS
OCCASIONAL FEELINGS OF UNSTEADINESS: 0
DEPRESSION: 0
LOSS OF SENSATION IN FEET: 0

## 2024-10-31 DIAGNOSIS — E78.1 HYPERTRIGLYCERIDEMIA: Primary | Chronic | ICD-10-CM

## 2024-10-31 RX ORDER — ICOSAPENT ETHYL 1 G/1
2 CAPSULE ORAL
Qty: 240 CAPSULE | Refills: 0 | Status: SHIPPED | OUTPATIENT
Start: 2024-10-31 | End: 2024-11-01

## 2024-11-01 ENCOUNTER — TELEPHONE (OUTPATIENT)
Dept: PRIMARY CARE | Facility: CLINIC | Age: 49
End: 2024-11-01
Payer: COMMERCIAL

## 2024-11-01 DIAGNOSIS — E78.1 HYPERTRIGLYCERIDEMIA: Primary | Chronic | ICD-10-CM

## 2024-11-01 LAB
CELL COUNT (BLOOD): 0.37 X10*3/UL
CELL POPULATIONS: NORMAL
DIAGNOSIS: NORMAL
FLOW DIFFERENTIAL: NORMAL
FLOW TEST ORDERED: NORMAL
LAB TEST METHOD: NORMAL
NUMBER OF CELLS COLLECTED: NORMAL
PATH REPORT.TOTAL CANCER: NORMAL
SIGNATURE COMMENT: NORMAL
SPECIMEN VIABILITY: NORMAL

## 2024-11-01 RX ORDER — ATORVASTATIN CALCIUM 20 MG/1
20 TABLET, FILM COATED ORAL DAILY
Qty: 60 TABLET | Refills: 0 | Status: SHIPPED | OUTPATIENT
Start: 2024-11-01 | End: 2024-11-01 | Stop reason: CLARIF

## 2024-11-01 RX ORDER — ROSUVASTATIN CALCIUM 5 MG/1
5 TABLET, COATED ORAL DAILY
Qty: 30 TABLET | Refills: 0 | Status: SHIPPED | OUTPATIENT
Start: 2024-11-01 | End: 2024-11-01

## 2024-11-01 RX ORDER — ATORVASTATIN CALCIUM 40 MG/1
40 TABLET, FILM COATED ORAL DAILY
Qty: 60 TABLET | Refills: 0 | Status: SHIPPED | OUTPATIENT
Start: 2024-11-01 | End: 2024-11-01

## 2024-11-05 LAB
LABORATORY COMMENT REPORT: NORMAL
LABORATORY COMMENT REPORT: NORMAL
PATH REPORT.FINAL DX SPEC: NORMAL
PATH REPORT.GROSS SPEC: NORMAL
PATH REPORT.RELEVANT HX SPEC: NORMAL
PATH REPORT.TOTAL CANCER: NORMAL

## 2024-11-08 ENCOUNTER — LAB (OUTPATIENT)
Dept: LAB | Facility: LAB | Age: 49
End: 2024-11-08
Payer: COMMERCIAL

## 2024-11-08 ENCOUNTER — APPOINTMENT (OUTPATIENT)
Dept: OTOLARYNGOLOGY | Facility: CLINIC | Age: 49
End: 2024-11-08
Payer: COMMERCIAL

## 2024-11-08 ENCOUNTER — TELEPHONE (OUTPATIENT)
Dept: OTOLARYNGOLOGY | Facility: CLINIC | Age: 49
End: 2024-11-08

## 2024-11-08 VITALS — WEIGHT: 210.3 LBS | HEIGHT: 67 IN | BODY MASS INDEX: 33.01 KG/M2

## 2024-11-08 DIAGNOSIS — R59.0 LAD (LYMPHADENOPATHY) OF LEFT CERVICAL REGION: ICD-10-CM

## 2024-11-08 DIAGNOSIS — E78.1 HYPERTRIGLYCERIDEMIA: Chronic | ICD-10-CM

## 2024-11-08 LAB
ALBUMIN SERPL BCP-MCNC: 4.5 G/DL (ref 3.4–5)
ALP SERPL-CCNC: 47 U/L (ref 33–110)
ALT SERPL W P-5'-P-CCNC: 22 U/L (ref 7–45)
ANION GAP SERPL CALC-SCNC: 14 MMOL/L (ref 10–20)
AST SERPL W P-5'-P-CCNC: 21 U/L (ref 9–39)
BILIRUB SERPL-MCNC: 0.7 MG/DL (ref 0–1.2)
BUN SERPL-MCNC: 9 MG/DL (ref 6–23)
CALCIUM SERPL-MCNC: 9.5 MG/DL (ref 8.6–10.6)
CHLORIDE SERPL-SCNC: 103 MMOL/L (ref 98–107)
CHOLEST SERPL-MCNC: 196 MG/DL (ref 0–199)
CHOLESTEROL/HDL RATIO: 5.4
CO2 SERPL-SCNC: 25 MMOL/L (ref 21–32)
CREAT SERPL-MCNC: 0.81 MG/DL (ref 0.5–1.05)
EGFRCR SERPLBLD CKD-EPI 2021: 89 ML/MIN/1.73M*2
GLUCOSE SERPL-MCNC: 121 MG/DL (ref 74–99)
HDLC SERPL-MCNC: 36 MG/DL
LDLC SERPL CALC-MCNC: 89 MG/DL
NON HDL CHOLESTEROL: 160 MG/DL (ref 0–149)
POTASSIUM SERPL-SCNC: 4.2 MMOL/L (ref 3.5–5.3)
PROT SERPL-MCNC: 7.9 G/DL (ref 6.4–8.2)
SODIUM SERPL-SCNC: 138 MMOL/L (ref 136–145)
TRIGL SERPL-MCNC: 353 MG/DL (ref 0–149)
VLDL: 71 MG/DL (ref 0–40)

## 2024-11-08 PROCEDURE — 36415 COLL VENOUS BLD VENIPUNCTURE: CPT

## 2024-11-08 PROCEDURE — 80061 LIPID PANEL: CPT

## 2024-11-08 PROCEDURE — 1036F TOBACCO NON-USER: CPT | Performed by: OTOLARYNGOLOGY

## 2024-11-08 PROCEDURE — 99213 OFFICE O/P EST LOW 20 MIN: CPT | Performed by: OTOLARYNGOLOGY

## 2024-11-08 PROCEDURE — 3008F BODY MASS INDEX DOCD: CPT | Performed by: OTOLARYNGOLOGY

## 2024-11-08 PROCEDURE — 80053 COMPREHEN METABOLIC PANEL: CPT

## 2024-11-08 ASSESSMENT — PAIN SCALES - GENERAL: PAINLEVEL_OUTOF10: 0-NO PAIN

## 2024-11-08 NOTE — H&P (VIEW-ONLY)
"Chief Complaint:    No chief complaint on file.      History of Present Illness:  49 y.o. female presents to [facility] on 11/08/24 with left neck lymphadenopathy.  Around Easter time, the patient reports that she was feeling her neck and found a \"lump\" in the left lateral aspect of her neck.  She thought the other side and noted that this was not present on the right.  Otherwise, she is completely asymptomatic from the lesion.  She denies a history of salivary issues.  Of note, the patient does have a history of a skin cancer just along the radix of the nose.  It sounds like this was excised over 5 years ago.    9/27/24: Patient returns for follow-up.  She previously saw Dr. Singer.  She recently had a repeat ultrasound which showed the left-sided lymph node is slightly larger in size.  It maintains a fatty hilum.  She has remained asymptomatic    11/8/24: Patient returns for follow-up after needle biopsy.  Cytology was negative however flow cytometry did show small population of clonal B-cell process.  Excisional biopsy recommended by pathology    Past Medical History  No past medical history on file.    Past Surgical History  Past Surgical History:   Procedure Laterality Date    BREAST RECONSTRUCTION Bilateral 01/01/2000 2000 bilateral breast reduction    HYSTERECTOMY  2015    Still has cervix and 1 ovary       Social History  Social History     Socioeconomic History    Marital status:      Spouse name: Not on file    Number of children: Not on file    Years of education: Not on file    Highest education level: Not on file   Occupational History    Not on file   Tobacco Use    Smoking status: Never     Passive exposure: Never    Smokeless tobacco: Never   Substance and Sexual Activity    Alcohol use: Never    Drug use: Never    Sexual activity: Not on file   Other Topics Concern    Not on file   Social History Narrative    Not on file     Social Drivers of Health "     Financial Resource Strain: Low Risk  (10/30/2024)    Overall Financial Resource Strain (CARDIA)     Difficulty of Paying Living Expenses: Not hard at all   Food Insecurity: No Food Insecurity (10/30/2024)    Hunger Vital Sign     Worried About Running Out of Food in the Last Year: Never true     Ran Out of Food in the Last Year: Never true   Transportation Needs: No Transportation Needs (10/30/2024)    PRAPARE - Transportation     Lack of Transportation (Medical): No     Lack of Transportation (Non-Medical): No   Physical Activity: Inactive (10/30/2024)    Exercise Vital Sign     Days of Exercise per Week: 0 days     Minutes of Exercise per Session: 0 min   Stress: Stress Concern Present (10/30/2024)    [employer] - Occupational Stress Questionnaire     Feeling of Stress : To some extent   Social Connections: Socially Integrated (10/30/2024)    Social Connection and Isolation Panel [NHANES]     Frequency of Communication with Friends and Family: More than three times a week     Frequency of Social Gatherings with Friends and Family: Once a week     Attends Congregation Services: 1 to 4 times per year     Active Member of Clubs or Organizations: No     Attends Club or Organization Meetings: 1 to 4 times per year     Marital Status:    Intimate Partner Violence: Not At Risk (10/30/2024)    Humiliation, Afraid, Rape, and Kick questionnaire     Fear of Current or Ex-Partner: No     Emotionally Abused: No     Physically Abused: No     Sexually Abused: No   Housing Stability: Low Risk  (10/30/2024)    Housing Stability Vital Sign     Unable to Pay for Housing in the Last Year: No     Number of Times Moved in the Last Year: 0     Homeless in the Last Year: No       Family History  Family History   Problem Relation Name Age of Onset    GI problems Mother      No Known Problems Father         Medications:  Current Outpatient Medications   Medication Sig Dispense Refill    acyclovir  (Zovirax) 400 mg tablet Take 1 tablet (400 mg) by mouth 2 times a day. 30 tablet 0    amLODIPine (Norvasc) 5 mg tablet Take 1 tablet (5 mg) by mouth once daily. 30 tablet 0    gemfibrozil (Lopid) 600 mg tablet Take 1 tablet (600 mg) by mouth 2 times a day. 90 tablet 1    metFORMIN (Glucophage) 500 mg tablet Take 1 tablet (500 mg) by mouth once daily with breakfast. Take with food. 90 tablet 1    montelukast (Singulair) 10 mg tablet Take 1 tablet (10 mg) by mouth once daily at bedtime. 30 tablet 1    olmesartan (BENIcar) 20 mg tablet Take 1 tablet (20 mg) by mouth once daily. 90 tablet 1    omeprazole (PriLOSEC) 40 mg DR capsule Take 1 capsule (40 mg) by mouth once daily. 30 capsule 3     No current facility-administered medications for this visit.       Allergies: Doxycycline, Hydromorphone, Oxycodone-acetaminophen, Penicillins, and Sulfa (sulfonamide antibiotics)    Immunizations:   Immunization History   Administered Date(s) Administered    Flu vaccine (IIV4), preservative free *Check age/dose* 12/11/2023    Flu vaccine, trivalent, preservative free, age 6 months and greater (Fluarix/Fluzone/Flulaval) 02/24/2011, 10/30/2024    Influenza, Unspecified 10/31/2011, 10/12/2012, 10/05/2015    Moderna COVID-19 vaccine, bivalent, blue cap/gray label *Check age/dose* 03/09/2023    Moderna SARS-CoV-2 Vaccination 03/15/2021, 04/14/2021, 11/26/2021    Tdap vaccine, age 7 year and older (BOOSTRIX, ADACEL) 10/30/2024        Review of Systems:  Constitutional: Negative for fever, weight loss and weight gain  HENT: Negative for ear pain, sore throat and hoarseness.  Negative for difficulty swallowing  Cardiovascular: Negative for chest pain and dyspnea on exertion (Can climb up 2 floors)  Respiratory: Is not experiencing shortness of breath  Gastrointestinal: Negative for nausea and vomiting  Neurological: Negative for headaches.   Psychiatric: The patient is not nervous/anxious   Musculoskeletal: Denies muscle  pain/weakness  Heme/Lymph: Negative for lymph nodes, easy bruising    Physical Exam  Vital Signs:  There were no vitals taken for this visit.    General:  Well-developed, well-nourished. No distress.  Overweight female  Communication and Voice:  Clear pitch and clarity  Respiratory  Respiratory effort:  Equal inspiration and expiration without stridor  Cardiovascular  Peripheral Vascular:  Warm extremities with equal pulses  Neuro: Patient oriented to person, place, and time;  Appropriate mood and affect;  Gait is intact with no imbalance; Cranial nerves II-XII are intact  Head and Face  Inspection:  Normocephalic and atraumatic without mass or lesion  Palpation:  Facial skeleton intact without bony stepoffs  Facial Strength:  Facial motility symmetric and full bilaterally  Eyes: PERRLA. No nystagmus with normal extraocular motion bilaterally  ENT  Pinna:  External ear intact and fully developed  External canal:  Canal is patent with intact skin  Tympanic Membrane:  Clear and mobile  External nose:  No scar or anatomic deformity  Internal Nose:  Septum intact and midline.  No edema, polyp, or rhinorrhea.  TMJ:  No pain to palpation with full mobility  Salivary Glands:  No mass or tenderness  Lips: No lesion.  Oral cavity: No mass or lesion.  Oropharynx:  No mass or lesion. Tonsillar fossa symmetric. Base of tongue soft without mass or induration  Neck  Trachea:  Midline trachea.  Thyroid:  No mass or nodularity  Lymphatics: I do feel an approximately 2 cm mass just inferior to the left submandibular gland.  This feels separate from the submandibular gland.  There is no overlying skin changes or pain with palpation     Procedure Note: Flexible Nasolaryngoscopy  Verbal informed consent was obtained from the patient/patient's guardian. 4% lidocaine mixed with phenylephrine was prepared and dripped into the nose. It was placed in the right naris. Following an appropriate amount of time to allow for adequate anesthesia,  a flexible fiberoptic nasolaryngoscope was placed into the patient's right naris. The nasal cavity, nasopharynx, oropharynx, hypopharynx, and all endolaryngeal structures were visualized and were normal except as listed below. Significant findings included:  -No concerning mucosal lesions  True vocal cord mobile      Impression/Plan:  48 y.o. female presents to [facility] on 05/14/24 with left neck lymphadenopathy.  Needle biopsy shows small clonal B-cell population    -Discussed open excisional lymph node biopsy.  Will get CT scan of the neck prior to biopsy.  Discussed risk, expected recovery, and procedure in general

## 2024-11-08 NOTE — TELEPHONE ENCOUNTER
Spoke with Leigh Ann and provided details for upcoming CT scan on 11/11 at the [facility], she had labs done today and is aware of the clear liquid prep. She is also set up for her excisional lymph node biopsy on 11/13 at . Surgery paperwork given in clinic earlier today and she has my number and I encouraged her to call with any questions or concerns, but I will touch base early next week once we have an official time for surgery. She is agreeable to plan.

## 2024-11-08 NOTE — PROGRESS NOTES
"Chief Complaint:    No chief complaint on file.      History of Present Illness:  49 y.o. female presents to [facility] on 11/08/24 with left neck lymphadenopathy.  Around Easter time, the patient reports that she was feeling her neck and found a \"lump\" in the left lateral aspect of her neck.  She thought the other side and noted that this was not present on the right.  Otherwise, she is completely asymptomatic from the lesion.  She denies a history of salivary issues.  Of note, the patient does have a history of a skin cancer just along the radix of the nose.  It sounds like this was excised over 5 years ago.    9/27/24: Patient returns for follow-up.  She previously saw Dr. Singer.  She recently had a repeat ultrasound which showed the left-sided lymph node is slightly larger in size.  It maintains a fatty hilum.  She has remained asymptomatic    11/8/24: Patient returns for follow-up after needle biopsy.  Cytology was negative however flow cytometry did show small population of clonal B-cell process.  Excisional biopsy recommended by pathology    Past Medical History  No past medical history on file.    Past Surgical History  Past Surgical History:   Procedure Laterality Date    BREAST RECONSTRUCTION Bilateral 01/01/2000 2000 bilateral breast reduction    HYSTERECTOMY  2015    Still has cervix and 1 ovary       Social History  Social History     Socioeconomic History    Marital status:      Spouse name: Not on file    Number of children: Not on file    Years of education: Not on file    Highest education level: Not on file   Occupational History    Not on file   Tobacco Use    Smoking status: Never     Passive exposure: Never    Smokeless tobacco: Never   Substance and Sexual Activity    Alcohol use: Never    Drug use: Never    Sexual activity: Not on file   Other Topics Concern    Not on file   Social History Narrative    Not on file     Social Drivers of Health "     Financial Resource Strain: Low Risk  (10/30/2024)    Overall Financial Resource Strain (CARDIA)     Difficulty of Paying Living Expenses: Not hard at all   Food Insecurity: No Food Insecurity (10/30/2024)    Hunger Vital Sign     Worried About Running Out of Food in the Last Year: Never true     Ran Out of Food in the Last Year: Never true   Transportation Needs: No Transportation Needs (10/30/2024)    PRAPARE - Transportation     Lack of Transportation (Medical): No     Lack of Transportation (Non-Medical): No   Physical Activity: Inactive (10/30/2024)    Exercise Vital Sign     Days of Exercise per Week: 0 days     Minutes of Exercise per Session: 0 min   Stress: Stress Concern Present (10/30/2024)    Citizen of [place] [employer] - Occupational Stress Questionnaire     Feeling of Stress : To some extent   Social Connections: Socially Integrated (10/30/2024)    Social Connection and Isolation Panel [NHANES]     Frequency of Communication with Friends and Family: More than three times a week     Frequency of Social Gatherings with Friends and Family: Once a week     Attends Quaker Services: 1 to 4 times per year     Active Member of Clubs or Organizations: No     Attends Club or Organization Meetings: 1 to 4 times per year     Marital Status:    Intimate Partner Violence: Not At Risk (10/30/2024)    Humiliation, Afraid, Rape, and Kick questionnaire     Fear of Current or Ex-Partner: No     Emotionally Abused: No     Physically Abused: No     Sexually Abused: No   Housing Stability: Low Risk  (10/30/2024)    Housing Stability Vital Sign     Unable to Pay for Housing in the Last Year: No     Number of Times Moved in the Last Year: 0     Homeless in the Last Year: No       Family History  Family History   Problem Relation Name Age of Onset    GI problems Mother      No Known Problems Father         Medications:  Current Outpatient Medications   Medication Sig Dispense Refill    acyclovir  (Zovirax) 400 mg tablet Take 1 tablet (400 mg) by mouth 2 times a day. 30 tablet 0    amLODIPine (Norvasc) 5 mg tablet Take 1 tablet (5 mg) by mouth once daily. 30 tablet 0    gemfibrozil (Lopid) 600 mg tablet Take 1 tablet (600 mg) by mouth 2 times a day. 90 tablet 1    metFORMIN (Glucophage) 500 mg tablet Take 1 tablet (500 mg) by mouth once daily with breakfast. Take with food. 90 tablet 1    montelukast (Singulair) 10 mg tablet Take 1 tablet (10 mg) by mouth once daily at bedtime. 30 tablet 1    olmesartan (BENIcar) 20 mg tablet Take 1 tablet (20 mg) by mouth once daily. 90 tablet 1    omeprazole (PriLOSEC) 40 mg DR capsule Take 1 capsule (40 mg) by mouth once daily. 30 capsule 3     No current facility-administered medications for this visit.       Allergies: Doxycycline, Hydromorphone, Oxycodone-acetaminophen, Penicillins, and Sulfa (sulfonamide antibiotics)    Immunizations:   Immunization History   Administered Date(s) Administered    Flu vaccine (IIV4), preservative free *Check age/dose* 12/11/2023    Flu vaccine, trivalent, preservative free, age 6 months and greater (Fluarix/Fluzone/Flulaval) 02/24/2011, 10/30/2024    Influenza, Unspecified 10/31/2011, 10/12/2012, 10/05/2015    Moderna COVID-19 vaccine, bivalent, blue cap/gray label *Check age/dose* 03/09/2023    Moderna SARS-CoV-2 Vaccination 03/15/2021, 04/14/2021, 11/26/2021    Tdap vaccine, age 7 year and older (BOOSTRIX, ADACEL) 10/30/2024        Review of Systems:  Constitutional: Negative for fever, weight loss and weight gain  HENT: Negative for ear pain, sore throat and hoarseness.  Negative for difficulty swallowing  Cardiovascular: Negative for chest pain and dyspnea on exertion (Can climb up 2 floors)  Respiratory: Is not experiencing shortness of breath  Gastrointestinal: Negative for nausea and vomiting  Neurological: Negative for headaches.   Psychiatric: The patient is not nervous/anxious   Musculoskeletal: Denies muscle  pain/weakness  Heme/Lymph: Negative for lymph nodes, easy bruising    Physical Exam  Vital Signs:  There were no vitals taken for this visit.    General:  Well-developed, well-nourished. No distress.  Overweight female  Communication and Voice:  Clear pitch and clarity  Respiratory  Respiratory effort:  Equal inspiration and expiration without stridor  Cardiovascular  Peripheral Vascular:  Warm extremities with equal pulses  Neuro: Patient oriented to person, place, and time;  Appropriate mood and affect;  Gait is intact with no imbalance; Cranial nerves II-XII are intact  Head and Face  Inspection:  Normocephalic and atraumatic without mass or lesion  Palpation:  Facial skeleton intact without bony stepoffs  Facial Strength:  Facial motility symmetric and full bilaterally  Eyes: PERRLA. No nystagmus with normal extraocular motion bilaterally  ENT  Pinna:  External ear intact and fully developed  External canal:  Canal is patent with intact skin  Tympanic Membrane:  Clear and mobile  External nose:  No scar or anatomic deformity  Internal Nose:  Septum intact and midline.  No edema, polyp, or rhinorrhea.  TMJ:  No pain to palpation with full mobility  Salivary Glands:  No mass or tenderness  Lips: No lesion.  Oral cavity: No mass or lesion.  Oropharynx:  No mass or lesion. Tonsillar fossa symmetric. Base of tongue soft without mass or induration  Neck  Trachea:  Midline trachea.  Thyroid:  No mass or nodularity  Lymphatics: I do feel an approximately 2 cm mass just inferior to the left submandibular gland.  This feels separate from the submandibular gland.  There is no overlying skin changes or pain with palpation     Procedure Note: Flexible Nasolaryngoscopy  Verbal informed consent was obtained from the patient/patient's guardian. 4% lidocaine mixed with phenylephrine was prepared and dripped into the nose. It was placed in the right naris. Following an appropriate amount of time to allow for adequate anesthesia,  a flexible fiberoptic nasolaryngoscope was placed into the patient's right naris. The nasal cavity, nasopharynx, oropharynx, hypopharynx, and all endolaryngeal structures were visualized and were normal except as listed below. Significant findings included:  -No concerning mucosal lesions  True vocal cord mobile      Impression/Plan:  48 y.o. female presents to [facility] on 05/14/24 with left neck lymphadenopathy.  Needle biopsy shows small clonal B-cell population    -Discussed open excisional lymph node biopsy.  Will get CT scan of the neck prior to biopsy.  Discussed risk, expected recovery, and procedure in general

## 2024-11-11 ENCOUNTER — HOSPITAL ENCOUNTER (OUTPATIENT)
Dept: RADIOLOGY | Facility: CLINIC | Age: 49
Discharge: HOME | End: 2024-11-11
Payer: COMMERCIAL

## 2024-11-11 DIAGNOSIS — R59.0 LAD (LYMPHADENOPATHY) OF LEFT CERVICAL REGION: ICD-10-CM

## 2024-11-11 PROCEDURE — 70491 CT SOFT TISSUE NECK W/DYE: CPT

## 2024-11-11 PROCEDURE — 70491 CT SOFT TISSUE NECK W/DYE: CPT | Performed by: RADIOLOGY

## 2024-11-11 PROCEDURE — 2550000001 HC RX 255 CONTRASTS: Performed by: OTOLARYNGOLOGY

## 2024-11-12 DIAGNOSIS — E04.9 ENLARGED THYROID: Primary | ICD-10-CM

## 2024-11-13 ENCOUNTER — HOSPITAL ENCOUNTER (OUTPATIENT)
Facility: HOSPITAL | Age: 49
Setting detail: OUTPATIENT SURGERY
Discharge: HOME | End: 2024-11-13
Attending: OTOLARYNGOLOGY | Admitting: OTOLARYNGOLOGY
Payer: COMMERCIAL

## 2024-11-13 ENCOUNTER — ANESTHESIA (OUTPATIENT)
Dept: OPERATING ROOM | Facility: HOSPITAL | Age: 49
End: 2024-11-13
Payer: COMMERCIAL

## 2024-11-13 ENCOUNTER — ANESTHESIA EVENT (OUTPATIENT)
Dept: OPERATING ROOM | Facility: HOSPITAL | Age: 49
End: 2024-11-13
Payer: COMMERCIAL

## 2024-11-13 VITALS
HEART RATE: 55 BPM | OXYGEN SATURATION: 95 % | RESPIRATION RATE: 18 BRPM | TEMPERATURE: 97.7 F | SYSTOLIC BLOOD PRESSURE: 162 MMHG | WEIGHT: 210.3 LBS | BODY MASS INDEX: 33.01 KG/M2 | HEIGHT: 67 IN | DIASTOLIC BLOOD PRESSURE: 76 MMHG

## 2024-11-13 DIAGNOSIS — B37.9 YEAST INFECTION: ICD-10-CM

## 2024-11-13 DIAGNOSIS — G89.18 ACUTE POSTOPERATIVE PAIN: Primary | ICD-10-CM

## 2024-11-13 DIAGNOSIS — R59.0 LAD (LYMPHADENOPATHY) OF LEFT CERVICAL REGION: ICD-10-CM

## 2024-11-13 LAB — GLUCOSE BLD MANUAL STRIP-MCNC: 131 MG/DL (ref 74–99)

## 2024-11-13 PROCEDURE — 82947 ASSAY GLUCOSE BLOOD QUANT: CPT

## 2024-11-13 PROCEDURE — 88341 IMHCHEM/IMCYTCHM EA ADD ANTB: CPT | Performed by: PATHOLOGY

## 2024-11-13 PROCEDURE — 88185 FLOWCYTOMETRY/TC ADD-ON: CPT | Mod: TC | Performed by: OTOLARYNGOLOGY

## 2024-11-13 PROCEDURE — 2500000004 HC RX 250 GENERAL PHARMACY W/ HCPCS (ALT 636 FOR OP/ED): Performed by: ANESTHESIOLOGIST ASSISTANT

## 2024-11-13 PROCEDURE — 3600000007 HC OR TIME - EACH INCREMENTAL 1 MINUTE - PROCEDURE LEVEL TWO: Performed by: OTOLARYNGOLOGY

## 2024-11-13 PROCEDURE — 7100000009 HC PHASE TWO TIME - INITIAL BASE CHARGE: Performed by: OTOLARYNGOLOGY

## 2024-11-13 PROCEDURE — 88307 TISSUE EXAM BY PATHOLOGIST: CPT | Performed by: PATHOLOGY

## 2024-11-13 PROCEDURE — 88189 FLOWCYTOMETRY/READ 16 & >: CPT | Performed by: OTOLARYNGOLOGY

## 2024-11-13 PROCEDURE — 7100000001 HC RECOVERY ROOM TIME - INITIAL BASE CHARGE: Performed by: OTOLARYNGOLOGY

## 2024-11-13 PROCEDURE — 88342 IMHCHEM/IMCYTCHM 1ST ANTB: CPT | Performed by: PATHOLOGY

## 2024-11-13 PROCEDURE — 7100000002 HC RECOVERY ROOM TIME - EACH INCREMENTAL 1 MINUTE: Performed by: OTOLARYNGOLOGY

## 2024-11-13 PROCEDURE — 2500000004 HC RX 250 GENERAL PHARMACY W/ HCPCS (ALT 636 FOR OP/ED): Performed by: OTOLARYNGOLOGY

## 2024-11-13 PROCEDURE — 3600000002 HC OR TIME - INITIAL BASE CHARGE - PROCEDURE LEVEL TWO: Performed by: OTOLARYNGOLOGY

## 2024-11-13 PROCEDURE — 2720000007 HC OR 272 NO HCPCS: Performed by: OTOLARYNGOLOGY

## 2024-11-13 PROCEDURE — A38510 PR BX/REMV,LYMPH NODE,DEEP CERV: Performed by: ANESTHESIOLOGIST ASSISTANT

## 2024-11-13 PROCEDURE — 38510 BIOPSY/REMOVAL LYMPH NODES: CPT | Performed by: OTOLARYNGOLOGY

## 2024-11-13 PROCEDURE — 88307 TISSUE EXAM BY PATHOLOGIST: CPT | Mod: TC,STJLAB | Performed by: OTOLARYNGOLOGY

## 2024-11-13 PROCEDURE — 3700000002 HC GENERAL ANESTHESIA TIME - EACH INCREMENTAL 1 MINUTE: Performed by: OTOLARYNGOLOGY

## 2024-11-13 PROCEDURE — 3700000001 HC GENERAL ANESTHESIA TIME - INITIAL BASE CHARGE: Performed by: OTOLARYNGOLOGY

## 2024-11-13 PROCEDURE — A38510 PR BX/REMV,LYMPH NODE,DEEP CERV: Performed by: ANESTHESIOLOGY

## 2024-11-13 PROCEDURE — 7100000010 HC PHASE TWO TIME - EACH INCREMENTAL 1 MINUTE: Performed by: OTOLARYNGOLOGY

## 2024-11-13 RX ORDER — TRAMADOL HYDROCHLORIDE 50 MG/1
50 TABLET ORAL EVERY 6 HOURS PRN
Qty: 12 TABLET | Refills: 0 | Status: SHIPPED | OUTPATIENT
Start: 2024-11-13 | End: 2024-11-16

## 2024-11-13 RX ORDER — SODIUM CHLORIDE, SODIUM LACTATE, POTASSIUM CHLORIDE, CALCIUM CHLORIDE 600; 310; 30; 20 MG/100ML; MG/100ML; MG/100ML; MG/100ML
INJECTION, SOLUTION INTRAVENOUS CONTINUOUS PRN
Status: DISCONTINUED | OUTPATIENT
Start: 2024-11-13 | End: 2024-11-13

## 2024-11-13 RX ORDER — DEXAMETHASONE SODIUM PHOSPHATE 10 MG/ML
INJECTION INTRAMUSCULAR; INTRAVENOUS AS NEEDED
Status: DISCONTINUED | OUTPATIENT
Start: 2024-11-13 | End: 2024-11-13

## 2024-11-13 RX ORDER — CEFAZOLIN SODIUM 2 G/100ML
INJECTION, SOLUTION INTRAVENOUS AS NEEDED
Status: DISCONTINUED | OUTPATIENT
Start: 2024-11-13 | End: 2024-11-13

## 2024-11-13 RX ORDER — ACETAMINOPHEN 325 MG/1
975 TABLET ORAL ONCE
Status: DISCONTINUED | OUTPATIENT
Start: 2024-11-13 | End: 2024-11-13 | Stop reason: HOSPADM

## 2024-11-13 RX ORDER — SUCCINYLCHOLINE/SOD CL,ISO/PF 100 MG/5ML
SYRINGE (ML) INTRAVENOUS AS NEEDED
Status: DISCONTINUED | OUTPATIENT
Start: 2024-11-13 | End: 2024-11-13

## 2024-11-13 RX ORDER — DIPHENHYDRAMINE HYDROCHLORIDE 50 MG/ML
INJECTION INTRAMUSCULAR; INTRAVENOUS AS NEEDED
Status: DISCONTINUED | OUTPATIENT
Start: 2024-11-13 | End: 2024-11-13

## 2024-11-13 RX ORDER — FENTANYL CITRATE 50 UG/ML
25 INJECTION, SOLUTION INTRAMUSCULAR; INTRAVENOUS EVERY 5 MIN PRN
Status: DISCONTINUED | OUTPATIENT
Start: 2024-11-13 | End: 2024-11-13 | Stop reason: HOSPADM

## 2024-11-13 RX ORDER — PROPOFOL 10 MG/ML
INJECTION, EMULSION INTRAVENOUS AS NEEDED
Status: DISCONTINUED | OUTPATIENT
Start: 2024-11-13 | End: 2024-11-13

## 2024-11-13 RX ORDER — FENTANYL CITRATE 50 UG/ML
12.5 INJECTION, SOLUTION INTRAMUSCULAR; INTRAVENOUS EVERY 5 MIN PRN
Status: DISCONTINUED | OUTPATIENT
Start: 2024-11-13 | End: 2024-11-13 | Stop reason: HOSPADM

## 2024-11-13 RX ORDER — MIDAZOLAM HYDROCHLORIDE 1 MG/ML
INJECTION, SOLUTION INTRAMUSCULAR; INTRAVENOUS AS NEEDED
Status: DISCONTINUED | OUTPATIENT
Start: 2024-11-13 | End: 2024-11-13

## 2024-11-13 RX ORDER — MIDAZOLAM HYDROCHLORIDE 1 MG/ML
1 INJECTION, SOLUTION INTRAMUSCULAR; INTRAVENOUS ONCE AS NEEDED
Status: DISCONTINUED | OUTPATIENT
Start: 2024-11-13 | End: 2024-11-13 | Stop reason: HOSPADM

## 2024-11-13 RX ORDER — ALBUTEROL SULFATE 0.83 MG/ML
2.5 SOLUTION RESPIRATORY (INHALATION) ONCE AS NEEDED
Status: DISCONTINUED | OUTPATIENT
Start: 2024-11-13 | End: 2024-11-13 | Stop reason: HOSPADM

## 2024-11-13 RX ORDER — ACETAMINOPHEN 325 MG/1
650 TABLET ORAL EVERY 4 HOURS PRN
Status: DISCONTINUED | OUTPATIENT
Start: 2024-11-13 | End: 2024-11-13 | Stop reason: HOSPADM

## 2024-11-13 RX ORDER — FLUCONAZOLE 150 MG/1
150 TABLET ORAL ONCE
Qty: 1 TABLET | Refills: 0 | Status: SHIPPED | OUTPATIENT
Start: 2024-11-13 | End: 2024-11-13

## 2024-11-13 RX ORDER — ONDANSETRON HYDROCHLORIDE 2 MG/ML
4 INJECTION, SOLUTION INTRAVENOUS ONCE AS NEEDED
Status: DISCONTINUED | OUTPATIENT
Start: 2024-11-13 | End: 2024-11-13 | Stop reason: HOSPADM

## 2024-11-13 RX ORDER — HYDRALAZINE HYDROCHLORIDE 20 MG/ML
5 INJECTION INTRAMUSCULAR; INTRAVENOUS EVERY 30 MIN PRN
Status: DISCONTINUED | OUTPATIENT
Start: 2024-11-13 | End: 2024-11-13 | Stop reason: HOSPADM

## 2024-11-13 RX ORDER — FENTANYL CITRATE 50 UG/ML
50 INJECTION, SOLUTION INTRAMUSCULAR; INTRAVENOUS EVERY 5 MIN PRN
Status: DISCONTINUED | OUTPATIENT
Start: 2024-11-13 | End: 2024-11-13 | Stop reason: HOSPADM

## 2024-11-13 RX ORDER — ONDANSETRON HYDROCHLORIDE 2 MG/ML
INJECTION, SOLUTION INTRAVENOUS AS NEEDED
Status: DISCONTINUED | OUTPATIENT
Start: 2024-11-13 | End: 2024-11-13

## 2024-11-13 RX ORDER — FENTANYL CITRATE 50 UG/ML
INJECTION, SOLUTION INTRAMUSCULAR; INTRAVENOUS AS NEEDED
Status: DISCONTINUED | OUTPATIENT
Start: 2024-11-13 | End: 2024-11-13

## 2024-11-13 RX ORDER — LIDOCAINE HYDROCHLORIDE AND EPINEPHRINE 10; 10 UG/ML; MG/ML
INJECTION, SOLUTION INFILTRATION; PERINEURAL AS NEEDED
Status: DISCONTINUED | OUTPATIENT
Start: 2024-11-13 | End: 2024-11-13 | Stop reason: HOSPADM

## 2024-11-13 RX ORDER — HYDROMORPHONE HYDROCHLORIDE 1 MG/ML
INJECTION, SOLUTION INTRAMUSCULAR; INTRAVENOUS; SUBCUTANEOUS AS NEEDED
Status: DISCONTINUED | OUTPATIENT
Start: 2024-11-13 | End: 2024-11-13

## 2024-11-13 SDOH — HEALTH STABILITY: MENTAL HEALTH: CURRENT SMOKER: 0

## 2024-11-13 ASSESSMENT — PAIN - FUNCTIONAL ASSESSMENT
PAIN_FUNCTIONAL_ASSESSMENT: 0-10

## 2024-11-13 ASSESSMENT — PAIN SCALES - GENERAL
PAINLEVEL_OUTOF10: 0 - NO PAIN

## 2024-11-13 ASSESSMENT — COLUMBIA-SUICIDE SEVERITY RATING SCALE - C-SSRS
1. IN THE PAST MONTH, HAVE YOU WISHED YOU WERE DEAD OR WISHED YOU COULD GO TO SLEEP AND NOT WAKE UP?: NO
2. HAVE YOU ACTUALLY HAD ANY THOUGHTS OF KILLING YOURSELF?: NO
6. HAVE YOU EVER DONE ANYTHING, STARTED TO DO ANYTHING, OR PREPARED TO DO ANYTHING TO END YOUR LIFE?: NO

## 2024-11-13 NOTE — ANESTHESIA PREPROCEDURE EVALUATION
"Patient: Annabel Quiñonez \"Srini"    Procedure Information       Date/Time: 11/13/24 8415    Procedure: Biopsy Lymph Node Head/Neck (Left) - Excisional lymph node biopsy left neck    Location: [facility] OR 07 / [facility] OR    Surgeons: Octavio Clarke MD            Relevant Problems   Cardiac   (+) Benign essential hypertension   (+) Hyperlipidemia   (+) Hypertriglyceridemia      GI   (+) GERD (gastroesophageal reflux disease)      Hematology   (+) Anemia due to chronic blood loss      ID   (+) Genital herpes       Clinical information reviewed:   Tobacco  Allergies  Meds  Problems  Med Hx  Surg Hx  OB Status    Fam Hx  Soc Hx        NPO Detail:  NPO/Void Status  Carbohydrate Drink Given Prior to Surgery? : N  Date of Last Liquid: 11/13/24  Time of Last Liquid: 1000  Date of Last Solid: 11/13/24  Time of Last Solid: 0400  Last Intake Type: Clear fluids  Time of Last Void: 1300         Physical Exam    Airway  Mallampati: II  TM distance: >3 FB  Neck ROM: full     Cardiovascular - normal exam  Rhythm: regular  Rate: normal     Dental - normal exam     Pulmonary - normal exam  Breath sounds clear to auscultation     Abdominal   (+) obese  Abdomen: soft  Bowel sounds: normal           Anesthesia Plan    History of general anesthesia?: yes  History of complications of general anesthesia?: no    ASA 3     general     The patient is not a current smoker.  Patient was previously instructed to abstain from smoking on day of procedure.  Patient did not smoke on day of procedure.  Education provided regarding risk of obstructive sleep apnea.  intravenous induction   Postoperative administration of opioids is intended.  Anesthetic plan and risks discussed with patient.  Use of blood products discussed with patient who consented to blood products.    Plan discussed with CAA.      "

## 2024-11-13 NOTE — ANESTHESIA PROCEDURE NOTES
Airway  Date/Time: 11/13/2024 2:33 PM  Urgency: elective    Airway not difficult    Staffing  Performed: DARWIN   Authorized by: Minoo Cherry MD    Performed by: DARWIN Slaughter  Patient location during procedure: OR    Indications and Patient Condition  Indications for airway management: anesthesia  Spontaneous Ventilation: absent  Sedation level: deep  Preoxygenated: yes  Patient position: sniffing  MILS maintained throughout  Mask difficulty assessment: 1 - vent by mask    Final Airway Details  Final airway type: endotracheal airway      Successful airway: ETT  Cuffed: yes   Successful intubation technique: direct laryngoscopy  Endotracheal tube insertion site: oral  Blade: [place]  Blade size: #3  ETT size (mm): 7.5  Cormack-Lehane Classification: grade I - full view of glottis  Placement verified by: chest auscultation and capnometry   Cuff volume (mL): 6  Measured from: lips  ETT to lips (cm): 22  Number of attempts at approach: 1    Additional Comments  Lips and teeth in pre anesthetic conditions after laryngoscopy

## 2024-11-13 NOTE — DISCHARGE INSTRUCTIONS
DEPARTMENT OF OTOLARYNGOLOGY (ENT)  DISCHARGE INSTRUCTIONS    C O N F I D E N T I A L   I N F O R M A T I O N  -------------------------------------------------------------------------------------------------------------------    Annabel Quiñonez  [id number]    The following is a brief overview of your hospitalization. Some of the information contained on this summary may be confidential.  This information should be kept in your records and should be shared with your regular doctor.    Admission Date:11/13/2024 12:28 PM  Discharge Date: No discharge date for patient encounter.    Disposition:  home    PRINCIPAL DIAGNOSIS (reason after study for this admission): lymphadenopathy    Physicians:   Dr. Clarke    Operations performed while hospitalized:   Open excisional lymph node biopsy      Treatment/wound care:   Keep area(s) clean and dry.   It is okay to shower 48 hours after time of surgery.    Do not scrub wound(s), pat dry.    Do not submerge wound(s) in standing water until seen in followup (no tub bathing, swimming, or hot tubs).    Please visually inspect your wound(s) at least once daily.  If the wound(s) are in a difficult to see location, please use a mirror or have someone else assist with visual inspection.    If you have sutures that you can see outside of the skin or staples:  Please have staples/sutures removed in our clinic 10-14 days after the date of surgery.  Do not remove the staples/sutures on your own.  Return sooner or call if wound(s) or surrounding area have increased swelling, pain, warmth, redness, or drainage that is thick, yellow and/or green.    If you see white bandages on your neck:  You have steri strips (small paper tape) along your incision. You can shower, pat dry; do not soak in a tub.  The steri strips will fall off on their own; do not peel them off.      Pain Control:    Adequate management:   Tylenol and ibuprofen can be taken as prescribed as needed for breakthrough pain.     Please take stool softeners when taking this medication to prevent constipation.    Activity after Discharge:   No heavy lifting, weight bearing as tolerated, no driving until mobility is returned to normal.   Do not submerge wound(s) in standing water for 7 days after surgery (no tub bathing, swimming, or hot tubs).   Do not drive or operate heavy machinery while taking narcotic pain medications as these medications can alter perception, impair judgement, and slow reaction times.    Diet: regular    Call Provider if:  Breathing faster than normal  Breathing harder than normal or having retractions  Fever of 100.4 (38 C) or higher  Temperature is greater than 102 degrees  Chills  Drinking less than normal  Not being able to go 4-6 hours between albuterol treatments  Urinating less than normal, over 1 day  Acting very sleepy and difficult to awaken  Vomiting (throwing up) and not able to eat or drink for 12 hours  3 or more loose, watery bowel movements in 24 hours (diarrhea)  Any new concerning symptoms

## 2024-11-13 NOTE — OP NOTE
"Biopsy Lymph Node Head/Neck (L) Operative Note     Date: 2024  OR Location: [facility] OR    Name: Annabel Quiñonez \"Leigh Ann\", : 1975, Age: 49 y.o., MRN: [id number], Sex: female    Diagnosis  Pre-op Diagnosis      * LAD (lymphadenopathy) of left cervical region [R59.0] Post-op Diagnosis     * LAD (lymphadenopathy) of left cervical region [R59.0]     Procedures  Biopsy Lymph Node Head/Neck  08475 - DE BX/EXC LYMPH NODE OPEN DEEP CERVICAL NODE      Surgeons      * Octavio Clarke - Primary    Resident/Fellow/Other Assistant:  Surgeons and Role:  * No surgeons found with a matching role *    Staff:   Surgical Assistant:   Sheilaulator: Helen Gutierrezub Person: Consuelo  Circulator: Patricia Vanessa Person: Anjali    Anesthesia Staff: Anesthesiologist: Minoo Cherry MD  C-AA: DARWIN Slaughter    Procedure Summary  Anesthesia: General  ASA: III  Estimated Blood Loss: 5mL  Intra-op Medications:   Administrations occurring from 1455 to 1630 on 24:   Medication Name Total Dose   lidocaine-epinephrine (Xylocaine W/EPI) 1 %-1:100,000 injection 5 mL   diphenhydrAMINE (BENADryl) injection 50 mg   ondansetron (Zofran) 2 mg/mL injection 4 mg              Anesthesia Record               Intraprocedure I/O Totals       None           Specimen:   ID Type Source Tests Collected by Time   1 : LEFT LEVEL 2 LYMPH NODE FOR LYMPHOMA PROTOCOL Tissue LYMPH NODE LYMPHOMA FLOW CYTOMETRY TEST Octavio Clarke MD 2024 1448   2 : LEFT LEVEL 2 LYMPH NODE FOR LYMPHOMA PROTOCOL Tissue LYMPH NODE LYMPHOMA SURGICAL PATHOLOGY EXAM Octavio Clarke MD 2024 1451                 Drains and/or Catheters: * None in log *      Findings: Left level 2 lymph node sent for lymphoma protocol    Indications: Annabel Quiñonez \"Srini" is an 49 y.o. female who is having surgery for LAD (lymphadenopathy) of left cervical region [R59.0].     The patient was seen in the preoperative area. The risks, benefits, complications, treatment options, " non-operative alternatives, expected recovery and outcomes were discussed with the patient. The possibilities of reaction to medication, pulmonary aspiration, injury to surrounding structures, bleeding, recurrent infection, the need for additional procedures, failure to diagnose a condition, and creating a complication requiring transfusion or operation were discussed with the patient. The patient concurred with the proposed plan, giving informed consent.  The site of surgery was properly noted/marked if necessary per policy. The patient has been actively warmed in preoperative area. Preoperative antibiotics have been ordered and given within 1 hours of incision. Venous thrombosis prophylaxis have been ordered including bilateral sequential compression devices    Procedure Details: Patient was taken back to the operating room laid supine on the table.  Timeout was performed, general anesthesia induced, patient was intubated.  The left neck was exposed and a small curvilinear incision was made on the left neck.  The skin was injected with local anesthesia.  The neck was then prepped and draped in sterile fashion.  A 15 blade was used to make skin incision down into the subcutaneous fat.  Bovie cautery was used to elevate subplatysmal flaps.  A left level 2 lymph node was identified and circumferentially dissected.  This was actually 2 separate lymph nodes very close together.  They were sent fresh for lymphoma protocol.  Hemostasis was achieved.  A small amount of fibrillar was placed.  The incision was then closed in layers using 3-0 Vicryl for the deep layer and running 4-0 Monocryl for the skin.  Patient was extubated without issue and taken to PACU in stable condition    Complications:  None; patient tolerated the procedure well.    Disposition: PACU - hemodynamically stable.  Condition: stable       Octavio Clarke  Phone Number: [phone]

## 2024-11-13 NOTE — ANESTHESIA POSTPROCEDURE EVALUATION
"Patient: Annabel Quiñonez \"Leigh Ann\"    Procedure Summary       Date: 11/13/24 Room / Location: [facility] OR 07 / [facility]    Anesthesia Start: 1424 Anesthesia Stop: 1559    Procedure: Biopsy Lymph Node Head/Neck (Left: Neck) Diagnosis:       LAD (lymphadenopathy) of left cervical region      (LAD (lymphadenopathy) of left cervical region [R59.0])    Surgeons: Octavio Clarke MD Responsible Provider: Minoo Cherry MD    Anesthesia Type: general ASA Status: 3            Anesthesia Type: general    Vitals Value Taken Time   /92 11/13/24 1555   Temp 36.6 11/13/24 1559   Pulse 98 11/13/24 1559   Resp 16 11/13/24 1559   SpO2 100 % 11/13/24 1557   Vitals shown include unfiled device data.    Anesthesia Post Evaluation    Patient location during evaluation: PACU  Patient participation: complete - patient participated  Level of consciousness: awake and alert  Pain management: satisfactory to patient  Airway patency: patent  Cardiovascular status: acceptable  Respiratory status: acceptable, face mask, spontaneous ventilation and nonlabored ventilation  Hydration status: acceptable  Postoperative Nausea and Vomiting: none        No notable events documented.    "

## 2024-11-16 LAB
CELL COUNT (BLOOD): 8.7 X10*3/UL
CELL POPULATIONS: NORMAL
DIAGNOSIS: NORMAL
FLOW DIFFERENTIAL: NORMAL
FLOW TEST ORDERED: NORMAL
LAB TEST METHOD: NORMAL
NUMBER OF CELLS COLLECTED: NORMAL PER TUBE
PATH REPORT.TOTAL CANCER: NORMAL
SIGNATURE COMMENT: NORMAL
SPECIMEN VIABILITY: NORMAL

## 2024-11-19 LAB
LAB AP ASR DISCLAIMER: NORMAL
LABORATORY COMMENT REPORT: NORMAL
PATH REPORT.FINAL DX SPEC: NORMAL
PATH REPORT.GROSS SPEC: NORMAL
PATH REPORT.RELEVANT HX SPEC: NORMAL
PATH REPORT.TOTAL CANCER: NORMAL

## 2024-11-22 ENCOUNTER — OFFICE VISIT (OUTPATIENT)
Facility: CLINIC | Age: 49
End: 2024-11-22
Payer: COMMERCIAL

## 2024-11-22 ENCOUNTER — HOSPITAL ENCOUNTER (OUTPATIENT)
Dept: RADIOLOGY | Facility: CLINIC | Age: 49
Discharge: HOME | End: 2024-11-22
Payer: COMMERCIAL

## 2024-11-22 VITALS — HEIGHT: 67 IN | BODY MASS INDEX: 33.34 KG/M2 | WEIGHT: 212.4 LBS

## 2024-11-22 DIAGNOSIS — L03.221 CELLULITIS OF NECK: Primary | ICD-10-CM

## 2024-11-22 DIAGNOSIS — E04.9 ENLARGED THYROID: ICD-10-CM

## 2024-11-22 PROCEDURE — 1036F TOBACCO NON-USER: CPT | Performed by: OTOLARYNGOLOGY

## 2024-11-22 PROCEDURE — 76536 US EXAM OF HEAD AND NECK: CPT

## 2024-11-22 PROCEDURE — 3008F BODY MASS INDEX DOCD: CPT | Performed by: OTOLARYNGOLOGY

## 2024-11-22 PROCEDURE — 99024 POSTOP FOLLOW-UP VISIT: CPT | Performed by: OTOLARYNGOLOGY

## 2024-11-22 RX ORDER — FLUCONAZOLE 150 MG/1
150 TABLET ORAL ONCE
Qty: 1 TABLET | Refills: 0 | Status: SHIPPED | OUTPATIENT
Start: 2024-11-22 | End: 2024-11-22

## 2024-11-22 RX ORDER — CEPHALEXIN 500 MG/1
500 CAPSULE ORAL 3 TIMES DAILY
Qty: 21 CAPSULE | Refills: 0 | Status: SHIPPED | OUTPATIENT
Start: 2024-11-22 | End: 2024-11-29

## 2024-11-22 ASSESSMENT — PAIN SCALES - GENERAL: PAINLEVEL_OUTOF10: 0-NO PAIN

## 2024-11-22 NOTE — PROGRESS NOTES
"Chief Complaint:    Chief Complaint   Patient presents with    Follow-up       History of Present Illness:  49 y.o. female presents to [facility] on 24 with left neck lymphadenopathy.  Around Easter time, the patient reports that she was feeling her neck and found a \"lump\" in the left lateral aspect of her neck.  She thought the other side and noted that this was not present on the right.  Otherwise, she is completely asymptomatic from the lesion.  She denies a history of salivary issues.  Of note, the patient does have a history of a skin cancer just along the radix of the nose.  It sounds like this was excised over 5 years ago.    24: Patient returns for follow-up.  She previously saw Dr. Singer.  She recently had a repeat ultrasound which showed the left-sided lymph node is slightly larger in size.  It maintains a fatty hilum.  She has remained asymptomatic    24: Patient returns for follow-up after needle biopsy.  Cytology was negative however flow cytometry did show small population of clonal B-cell process.  Excisional biopsy recommended by pathology    24: Patient returns for exam.  Underwent open excisional biopsy last week.  Initially did not have any swelling then started to have increased swelling a few days ago.  It has become slightly more tender.  She had a thyroid ultrasound and ultrasound of this area.  The surgical site can be visualized on the ultrasound.  There is possible small fluid collection    Past Medical History  Past Medical History:   Diagnosis Date    GERD (gastroesophageal reflux disease)     Hyperlipidemia     Hypertension     Pancreatitis (HHS-HCC)     Type 2 diabetes mellitus        Past Surgical History  Past Surgical History:   Procedure Laterality Date    ANKLE SURGERY      Bone spur    APPENDECTOMY      BREAST RECONSTRUCTION Bilateral 2000 bilateral breast reduction     SECTION, LOW TRANSVERSE      " HYSTERECTOMY  2015    Still has cervix and 1 ovary    SKIN CANCER EXCISION      Nose    WRIST SURGERY         Social History  Social History     Socioeconomic History    Marital status:      Spouse name: Not on file    Number of children: Not on file    Years of education: Not on file    Highest education level: Not on file   Occupational History    Not on file   Tobacco Use    Smoking status: Never     Passive exposure: Never    Smokeless tobacco: Never   Vaping Use    Vaping status: Never Used   Substance and Sexual Activity    Alcohol use: Never    Drug use: Never    Sexual activity: Not on file   Other Topics Concern    Not on file   Social History Narrative    Not on file     Social Drivers of Health     Financial Resource Strain: Low Risk  (10/30/2024)    Overall Financial Resource Strain (CARDIA)     Difficulty of Paying Living Expenses: Not hard at all   Food Insecurity: No Food Insecurity (10/30/2024)    Hunger Vital Sign     Worried About Running Out of Food in the Last Year: Never true     Ran Out of Food in the Last Year: Never true   Transportation Needs: No Transportation Needs (10/30/2024)    PRAPARE - Transportation     Lack of Transportation (Medical): No     Lack of Transportation (Non-Medical): No   Physical Activity: Inactive (10/30/2024)    Exercise Vital Sign     Days of Exercise per Week: 0 days     Minutes of Exercise per Session: 0 min   Stress: Stress Concern Present (10/30/2024)    [employer] [place] [employer] - Occupational Stress Questionnaire     Feeling of Stress : To some extent   Social Connections: Socially Integrated (10/30/2024)    Social Connection and Isolation Panel [NHANES]     Frequency of Communication with Friends and Family: More than three times a week     Frequency of Social Gatherings with Friends and Family: Once a week     Attends Jain Services: 1 to 4 times per year     Active Member of Clubs or Organizations: No     Attends Club or  Organization Meetings: 1 to 4 times per year     Marital Status:    Intimate Partner Violence: Not At Risk (10/30/2024)    Humiliation, Afraid, Rape, and Kick questionnaire     Fear of Current or Ex-Partner: No     Emotionally Abused: No     Physically Abused: No     Sexually Abused: No   Housing Stability: Low Risk  (10/30/2024)    Housing Stability Vital Sign     Unable to Pay for Housing in the Last Year: No     Number of Times Moved in the Last Year: 0     Homeless in the Last Year: No       Family History  Family History   Problem Relation Name Age of Onset    GI problems Mother      No Known Problems Father         Medications:  Current Outpatient Medications   Medication Sig Dispense Refill    acyclovir (Zovirax) 400 mg tablet Take 1 tablet (400 mg) by mouth 2 times a day. 30 tablet 0    amLODIPine (Norvasc) 5 mg tablet Take 1 tablet (5 mg) by mouth once daily. (Patient not taking: Reported on 11/13/2024) 30 tablet 0    gemfibrozil (Lopid) 600 mg tablet Take 1 tablet (600 mg) by mouth 2 times a day. 90 tablet 1    metFORMIN (Glucophage) 500 mg tablet Take 1 tablet (500 mg) by mouth once daily with breakfast. Take with food. 90 tablet 1    montelukast (Singulair) 10 mg tablet Take 1 tablet (10 mg) by mouth once daily at bedtime. 30 tablet 1    olmesartan (BENIcar) 20 mg tablet Take 1 tablet (20 mg) by mouth once daily. 90 tablet 1    omeprazole (PriLOSEC) 40 mg DR capsule Take 1 capsule (40 mg) by mouth once daily. 30 capsule 3     No current facility-administered medications for this visit.       Allergies: Doxycycline, Hydromorphone, Oxycodone-acetaminophen, Penicillins, Shrimp, Sulfa (sulfonamide antibiotics), Adhes. band-tape-benzalkonium, Other, and Stick deodorant    Immunizations:   Immunization History   Administered Date(s) Administered    Flu vaccine (IIV4), preservative free *Check age/dose* 12/11/2023    Flu vaccine, trivalent, preservative free, age 6 months and greater  "(Fluarix/Fluzone/Flulaval) 02/24/2011, 10/30/2024    Influenza, Unspecified 10/31/2011, 10/12/2012, 10/05/2015    Moderna COVID-19 vaccine, bivalent, blue cap/gray label *Check age/dose* 03/09/2023    Moderna SARS-CoV-2 Vaccination 03/15/2021, 04/14/2021, 11/26/2021    Tdap vaccine, age 7 year and older (BOOSTRIX, ADACEL) 10/30/2024        Review of Systems:  Constitutional: Negative for fever, weight loss and weight gain  HENT: Negative for ear pain, sore throat and hoarseness.  Negative for difficulty swallowing  Cardiovascular: Negative for chest pain and dyspnea on exertion (Can climb up 2 floors)  Respiratory: Is not experiencing shortness of breath  Gastrointestinal: Negative for nausea and vomiting  Neurological: Negative for headaches.   Psychiatric: The patient is not nervous/anxious   Musculoskeletal: Denies muscle pain/weakness  Heme/Lymph: Negative for lymph nodes, easy bruising    Physical Exam  Vital Signs:  Ht 1.702 m (5' 7\")   Wt 96.3 kg (212 lb 6.4 oz)   BMI 33.27 kg/m²     General:    Incision well-approximated.  No sign of infection.  There is some mild swelling and induration above the incision.  Verbal consent was obtained for aspiration.  The skin was injected with local anesthesia.  An 18-gauge needle was used to aspirate the surgical site and approximately 1.5 cc of fluid was aspirated.    Impression/Plan:  48 y.o. female presents to [facility]24 with left neck lymphadenopathy.  Needle biopsy shows small clonal B-cell population.  Open biopsy was negative for malignancy    -I will contact her once ultrasound is final  -Exam and ultrasound consistent with likely small seroma.  This was aspirated.  There is no obvious sign of infection however due to increasing pain I will send in a 1 week supply of Keflex.  Fluconazole was also provided in case she gets a yeast infection  "

## 2024-11-25 ENCOUNTER — OFFICE VISIT (OUTPATIENT)
Dept: OTOLARYNGOLOGY | Facility: CLINIC | Age: 49
End: 2024-11-25
Payer: COMMERCIAL

## 2024-11-25 ENCOUNTER — APPOINTMENT (OUTPATIENT)
Dept: OTOLARYNGOLOGY | Facility: CLINIC | Age: 49
End: 2024-11-25
Payer: COMMERCIAL

## 2024-11-25 VITALS
WEIGHT: 214 LBS | DIASTOLIC BLOOD PRESSURE: 86 MMHG | HEART RATE: 82 BPM | TEMPERATURE: 97.4 F | HEIGHT: 67 IN | OXYGEN SATURATION: 98 % | BODY MASS INDEX: 33.59 KG/M2 | SYSTOLIC BLOOD PRESSURE: 131 MMHG | RESPIRATION RATE: 16 BRPM

## 2024-11-25 DIAGNOSIS — R59.1 LYMPHADENOPATHY OF HEAD AND NECK: Primary | ICD-10-CM

## 2024-11-25 PROCEDURE — 3008F BODY MASS INDEX DOCD: CPT | Performed by: OTOLARYNGOLOGY

## 2024-11-25 PROCEDURE — 3075F SYST BP GE 130 - 139MM HG: CPT | Performed by: OTOLARYNGOLOGY

## 2024-11-25 PROCEDURE — 1036F TOBACCO NON-USER: CPT | Performed by: OTOLARYNGOLOGY

## 2024-11-25 PROCEDURE — 3079F DIAST BP 80-89 MM HG: CPT | Performed by: OTOLARYNGOLOGY

## 2024-11-25 PROCEDURE — 99211 OFF/OP EST MAY X REQ PHY/QHP: CPT | Performed by: OTOLARYNGOLOGY

## 2024-11-25 SDOH — ECONOMIC STABILITY: FOOD INSECURITY: WITHIN THE PAST 12 MONTHS, THE FOOD YOU BOUGHT JUST DIDN'T LAST AND YOU DIDN'T HAVE MONEY TO GET MORE.: NEVER TRUE

## 2024-11-25 SDOH — ECONOMIC STABILITY: FOOD INSECURITY: WITHIN THE PAST 12 MONTHS, YOU WORRIED THAT YOUR FOOD WOULD RUN OUT BEFORE YOU GOT MONEY TO BUY MORE.: NEVER TRUE

## 2024-11-25 ASSESSMENT — COLUMBIA-SUICIDE SEVERITY RATING SCALE - C-SSRS
6. HAVE YOU EVER DONE ANYTHING, STARTED TO DO ANYTHING, OR PREPARED TO DO ANYTHING TO END YOUR LIFE?: NO
2. HAVE YOU ACTUALLY HAD ANY THOUGHTS OF KILLING YOURSELF?: NO
1. IN THE PAST MONTH, HAVE YOU WISHED YOU WERE DEAD OR WISHED YOU COULD GO TO SLEEP AND NOT WAKE UP?: NO

## 2024-11-25 ASSESSMENT — PATIENT HEALTH QUESTIONNAIRE - PHQ9
2. FEELING DOWN, DEPRESSED OR HOPELESS: NOT AT ALL
SUM OF ALL RESPONSES TO PHQ9 QUESTIONS 1 AND 2: 0
1. LITTLE INTEREST OR PLEASURE IN DOING THINGS: NOT AT ALL

## 2024-11-25 ASSESSMENT — LIFESTYLE VARIABLES
HOW OFTEN DO YOU HAVE SIX OR MORE DRINKS ON ONE OCCASION: NEVER
HOW MANY STANDARD DRINKS CONTAINING ALCOHOL DO YOU HAVE ON A TYPICAL DAY: 1 OR 2
HOW OFTEN DO YOU HAVE A DRINK CONTAINING ALCOHOL: MONTHLY OR LESS
SKIP TO QUESTIONS 9-10: 1
AUDIT-C TOTAL SCORE: 1

## 2024-11-25 ASSESSMENT — PAIN SCALES - GENERAL: PAINLEVEL_OUTOF10: 2

## 2024-11-25 ASSESSMENT — ENCOUNTER SYMPTOMS
LOSS OF SENSATION IN FEET: 0
DEPRESSION: 0
OCCASIONAL FEELINGS OF UNSTEADINESS: 0

## 2024-11-25 NOTE — PROGRESS NOTES
"ENT Follow up Visit    History Of Present Illness  Annabel Quiñonez \"Srini" is a 49 y.o. female presents for of a left neck biopsy site  Had open biopsy of left neck node. It's been swollen and she was worried. No drainage. No redness. Not increasing in pain, slowly decreasing. Has been icing it and using tylenol     Past Medical History  She has a past medical history of Cancer (Multi) (2020), GERD (gastroesophageal reflux disease), Hyperlipidemia, Hypertension, Pancreatitis (HHS-HCC), and Type 2 diabetes mellitus.    Surgical History  She has a past surgical history that includes Breast reconstruction (Bilateral, 2000); Hysterectomy (); Appendectomy;  section, low transverse; Wrist surgery; Ankle surgery; Skin cancer excision; and Tubal ligation (10/31/09).     Social History  She reports that she has never smoked. She has never been exposed to tobacco smoke. She has never used smokeless tobacco. She reports that she does not drink alcohol and does not use drugs.    Family History  Family History   Problem Relation Name Age of Onset    GI problems Mother Victor M     Hypertension Mother Victor M     No Known Problems Father      Cancer Maternal Grandmother Amanda     Diabetes Maternal Grandmother Amanda         Allergies  Doxycycline, Hydromorphone, Oxycodone-acetaminophen, Penicillins, Shrimp, Sulfa (sulfonamide antibiotics), Adhes. band-tape-benzalkonium, Other, and Stick deodorant     Physical Exam:  NAD alert  Page eomi NCAT  Left neck incision healing well, there is mild fullness and firmness I would expect from recent open biopsy, not warm, no concern for infection or clinically significant hematoma     Last Recorded Vitals  Blood pressure 131/86, pulse 82, temperature 36.3 °C (97.4 °F), temperature source Temporal, resp. rate 16, height 1.702 m (5' 7\"), weight 97.1 kg (214 lb), SpO2 98%.    Relevant Results  US thyroid    Result Date: 2024  Interpreted By:  Yonatan Wright, STUDY: US " THYROID;  11/22/2024 11:30 am   INDICATION: Signs/Symptoms:assess for thyroid nodules.   COMPARISON: 09/10/2024   ACCESSION NUMBER(S): [id number]   ORDERING CLINICIAN: DINO MORRISON   TECHNIQUE: Multiple ultrasonographic images of the thyroid gland were obtained.   FINDINGS: Diffusely heterogeneous thyroid gland suggestive of thyroiditis.   RIGHT LOBE: 5.4 x 1.6 x 2 cm.   LEFT LOBE: 5.5 x 2 x 1.6 cm.   ISTHMUS: 0.6 cm.   Lymph nodes: No lymphadenopathy on the images provided.   Other: There is a multiloculated fluid collection in the left neck at level 2 measuring 3.5 x 1.9 x 1.8 cm. No internal vascularity. Flow is seen peripherally.       Complex multilocular fluid collection in the left neck at level 2 without hypervascularity, favored to represent evolving hematoma. Recommend clinical correlation and follow-up to ensure resolution.   Diffusely heterogeneous thyroid gland without discrete nodule.   Signed by: Yonatan Wright 11/23/2024 2:11 PM Dictation workstation:   [id number]    CT soft tissue neck w IV contrast    Result Date: 11/11/2024  Interpreted By:  Kimberley Blackburn, STUDY: CT SOFT TISSUE NECK W IV CONTRAST;  11/11/2024 11:07 am   INDICATION: Signs/Symptoms:Evaluate lymphadenopathy, scheduled for open biopsy 11/13.   ,R59.0 Localized enlarged lymph nodes   COMPARISON: Ultrasound 09/10/2024   ACCESSION NUMBER(S): [id number]   ORDERING CLINICIAN: DINO MORRISON   TECHNIQUE: Axial CT images of the neck were obtained.  The patient received 70 ML of Omnipaque 350 intravenous contrast agent. The images were reformatted in angled axial, coronal and sagittal planes.   FINDINGS: Oral Cavity, Pharynx and Larynx:  Evaluation oral cavity is limited by streak artifact from dental hardware.  The nasopharyngeal and oropharyngeal structures are unremarkable. The hypopharyngeal and laryngeal structures are unremarkable.   Retropharyngeal and Prevertebral Soft Tissues: Unremarkable.   Lymph nodes: No cervical  lymphadenopathy by size criteria.   Neck vessels: Bilateral neck vessels are normal in course and caliber and appear patent.   Thyroid gland: Diffusely heterogeneous thyroid gland.   Parotid and submandibular glands: Bilateral parotid and submandibular glands are unremarkable in appearance.   Paranasal Sinuses and Mastoids: Visualized paranasal sinuses and bilateral mastoids are predominantly clear.   Visualized orbital structures are unremarkable.   Visualized upper lungs are clear.   Mild degenerative changes of the cervical spine without spinal canal stenosis.       No evidence of significant cervical adenopathy or a soft tissue mass in the neck.   Diffusely heterogeneous thyroid gland. Consider thyroid ultrasound to further evaluate as clinically warranted.   MACRO: None   Signed by: Kimberley Blackburn 11/11/2024 3:07 PM Dictation workstation:   [id number]       Assessment and Plan  49 y.o. female who is s/p left neck biopsy for lymphoma    -healing as expected  -follow up with dr. Clarke as scheduled    Romel Diaz MD

## 2024-11-27 DIAGNOSIS — Z12.11 COLON CANCER SCREENING: ICD-10-CM

## 2024-11-29 RX ORDER — SODIUM, POTASSIUM,MAG SULFATES 17.5-3.13G
SOLUTION, RECONSTITUTED, ORAL ORAL
Qty: 1 EACH | Refills: 0 | Status: SHIPPED | OUTPATIENT
Start: 2024-11-29

## 2024-12-02 DIAGNOSIS — B37.9 YEAST INFECTION: ICD-10-CM

## 2024-12-02 RX ORDER — FLUCONAZOLE 150 MG/1
TABLET ORAL
Qty: 2 TABLET | Refills: 0 | Status: SHIPPED | OUTPATIENT
Start: 2024-12-02

## 2024-12-10 ENCOUNTER — APPOINTMENT (OUTPATIENT)
Dept: OBSTETRICS AND GYNECOLOGY | Facility: CLINIC | Age: 49
End: 2024-12-10
Payer: COMMERCIAL

## 2024-12-10 VITALS — WEIGHT: 218.7 LBS | BODY MASS INDEX: 34.25 KG/M2

## 2024-12-10 DIAGNOSIS — Z12.4 CERVICAL CANCER SCREENING: Primary | ICD-10-CM

## 2024-12-10 DIAGNOSIS — Z90.710 STATUS POST HYSTERECTOMY WITH OOPHORECTOMY: ICD-10-CM

## 2024-12-10 DIAGNOSIS — Z12.39 ENCOUNTER FOR SCREENING FOR MALIGNANT NEOPLASM OF BREAST, UNSPECIFIED SCREENING MODALITY: ICD-10-CM

## 2024-12-10 DIAGNOSIS — Z90.721 STATUS POST HYSTERECTOMY WITH OOPHORECTOMY: ICD-10-CM

## 2024-12-10 DIAGNOSIS — E78.1 HYPERTRIGLYCERIDEMIA: Primary | Chronic | ICD-10-CM

## 2024-12-10 DIAGNOSIS — Z01.419 WELL WOMAN EXAM WITH ROUTINE GYNECOLOGICAL EXAM: ICD-10-CM

## 2024-12-10 PROCEDURE — 87624 HPV HI-RISK TYP POOLED RSLT: CPT

## 2024-12-10 PROCEDURE — 99386 PREV VISIT NEW AGE 40-64: CPT

## 2024-12-10 ASSESSMENT — PATIENT HEALTH QUESTIONNAIRE - PHQ9
1. LITTLE INTEREST OR PLEASURE IN DOING THINGS: NOT AT ALL
SUM OF ALL RESPONSES TO PHQ9 QUESTIONS 1 & 2: 0
2. FEELING DOWN, DEPRESSED OR HOPELESS: NOT AT ALL

## 2024-12-10 NOTE — PROGRESS NOTES
"Subjective   Annabel Quiñonez \"Leigh Ann\" is a 49 y.o. female who is here for Annual Exam (New pt referred by dr mosley/Pt had supracervical hysterectomy, last pap  wnl/Hysterectomy for aub/Joelle done  /No concerns ).     Concerns today:  None today     Pt is s/p supracervical hysterectomy in     Just finished antibiotics last Friday - recently had a vaginal yeast infection, resolved with fluconazole. No ongoing concerns     Sexual Activity: sexually active, male partners  Pain with intercourse? No   Loss of desire? No   Able to have an orgasm? Yes     History of prior STI:  years ago  Desires STI screening? No    Current contraception:  hysterectomy     Last pap:  - normal per pt   History of abnormal Pap smear: no  Family history of uterine or ovarian cancer: no    Last mammogram:  - normal per pt in sept   History of abnormal mammogram: no  Family history of breast cancer: no  OB History    Para Term  AB Living   3 3 2 1 0 3   SAB IAB Ectopic Multiple Live Births   0 0 0 0 3      Objective   Wt 99.2 kg (218 lb 11.2 oz)    Physical Exam  Constitutional:       General: She is not in acute distress.     Appearance: Normal appearance. She is normal weight. She is not ill-appearing.   Genitourinary:      Vulva, rectum and urethral meatus normal.      No lesions in the vagina.      Right Labia: No rash, lesions, skin changes or Bartholin's cyst.     Left Labia: No lesions, skin changes, Bartholin's cyst or rash.     No labial fusion noted.      No vaginal discharge, erythema or tenderness.      No cervical discharge, friability, lesion, polyp or nabothian cyst.      Pelvic exam was performed with patient in the lithotomy position.   Breasts:     Breasts are soft.     Right: Normal.      Left: Normal.   HENT:      Head: Normocephalic.      Nose: Nose normal. No congestion.      Mouth/Throat:      Mouth: Mucous membranes are moist.      Pharynx: Oropharynx is clear. No oropharyngeal exudate or " posterior oropharyngeal erythema.   Eyes:      General:         Right eye: No discharge.         Left eye: No discharge.      Extraocular Movements: Extraocular movements intact.      Conjunctiva/sclera: Conjunctivae normal.      Pupils: Pupils are equal, round, and reactive to light.   Neck:      Thyroid: No thyroid mass, thyromegaly or thyroid tenderness.   Cardiovascular:      Rate and Rhythm: Normal rate and regular rhythm.      Heart sounds: Normal heart sounds.   Pulmonary:      Effort: Pulmonary effort is normal.      Breath sounds: Normal breath sounds and air entry.   Abdominal:      General: Abdomen is flat. There is no distension.      Palpations: Abdomen is soft.   Musculoskeletal:         General: No swelling, deformity or signs of injury. Normal range of motion.      Cervical back: Full passive range of motion without pain and normal range of motion. No edema, erythema or tenderness.   Neurological:      General: No focal deficit present.      Mental Status: She is alert and oriented to person, place, and time. Mental status is at baseline.      Sensory: No sensory deficit.      Motor: No weakness.      Gait: Gait normal.   Skin:     General: Skin is warm and dry.      Findings: No bruising, erythema, lesion or rash.   Psychiatric:         Mood and Affect: Mood normal.         Behavior: Behavior normal.         Thought Content: Thought content normal.         Judgment: Judgment normal.   Vitals reviewed.          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  Johanny Carreon, ANTHONY-ALEXANDRU

## 2024-12-16 ENCOUNTER — ANESTHESIA EVENT (OUTPATIENT)
Dept: GASTROENTEROLOGY | Facility: EXTERNAL LOCATION | Age: 49
End: 2024-12-16

## 2024-12-20 ENCOUNTER — ANESTHESIA (OUTPATIENT)
Dept: GASTROENTEROLOGY | Facility: EXTERNAL LOCATION | Age: 49
End: 2024-12-20

## 2024-12-20 ENCOUNTER — APPOINTMENT (OUTPATIENT)
Dept: GASTROENTEROLOGY | Facility: EXTERNAL LOCATION | Age: 49
End: 2024-12-20
Payer: COMMERCIAL

## 2024-12-20 VITALS
TEMPERATURE: 96.8 F | DIASTOLIC BLOOD PRESSURE: 85 MMHG | OXYGEN SATURATION: 96 % | HEIGHT: 67 IN | WEIGHT: 215 LBS | BODY MASS INDEX: 33.74 KG/M2 | SYSTOLIC BLOOD PRESSURE: 140 MMHG | HEART RATE: 75 BPM | RESPIRATION RATE: 16 BRPM

## 2024-12-20 DIAGNOSIS — Z12.9 SCREENING FOR MALIGNANT NEOPLASM: Primary | ICD-10-CM

## 2024-12-20 DIAGNOSIS — K92.1 HEMATOCHEZIA: ICD-10-CM

## 2024-12-20 PROCEDURE — 45378 DIAGNOSTIC COLONOSCOPY: CPT | Performed by: INTERNAL MEDICINE

## 2024-12-20 RX ORDER — PROPOFOL 10 MG/ML
INJECTION, EMULSION INTRAVENOUS AS NEEDED
Status: DISCONTINUED | OUTPATIENT
Start: 2024-12-20 | End: 2024-12-20

## 2024-12-20 RX ORDER — SODIUM CHLORIDE 9 MG/ML
INJECTION, SOLUTION INTRAVENOUS CONTINUOUS PRN
Status: DISCONTINUED | OUTPATIENT
Start: 2024-12-20 | End: 2024-12-20

## 2024-12-20 RX ORDER — LIDOCAINE HYDROCHLORIDE 20 MG/ML
INJECTION, SOLUTION INFILTRATION; PERINEURAL AS NEEDED
Status: DISCONTINUED | OUTPATIENT
Start: 2024-12-20 | End: 2024-12-20

## 2024-12-20 SDOH — HEALTH STABILITY: MENTAL HEALTH: CURRENT SMOKER: 0

## 2024-12-20 ASSESSMENT — ENCOUNTER SYMPTOMS
ARTHRALGIAS: 0
ABDOMINAL PAIN: 0
TROUBLE SWALLOWING: 0
VOMITING: 0
DIZZINESS: 0
DIFFICULTY URINATING: 0
SHORTNESS OF BREATH: 0
ABDOMINAL DISTENTION: 0
DIARRHEA: 0
SLEEP DISTURBANCE: 0
HEADACHES: 0
CONSTIPATION: 0
COUGH: 0
CONFUSION: 0
COLOR CHANGE: 0
FEVER: 0
NAUSEA: 0
LIGHT-HEADEDNESS: 0
SPEECH DIFFICULTY: 0
WHEEZING: 0
CHILLS: 0
UNEXPECTED WEIGHT CHANGE: 0
JOINT SWELLING: 0

## 2024-12-20 ASSESSMENT — PAIN SCALES - GENERAL
PAIN_LEVEL: 0
PAINLEVEL_OUTOF10: 0 - NO PAIN

## 2024-12-20 ASSESSMENT — COLUMBIA-SUICIDE SEVERITY RATING SCALE - C-SSRS
1. IN THE PAST MONTH, HAVE YOU WISHED YOU WERE DEAD OR WISHED YOU COULD GO TO SLEEP AND NOT WAKE UP?: NO
6. HAVE YOU EVER DONE ANYTHING, STARTED TO DO ANYTHING, OR PREPARED TO DO ANYTHING TO END YOUR LIFE?: NO
2. HAVE YOU ACTUALLY HAD ANY THOUGHTS OF KILLING YOURSELF?: NO

## 2024-12-20 ASSESSMENT — PAIN - FUNCTIONAL ASSESSMENT
PAIN_FUNCTIONAL_ASSESSMENT: 0-10

## 2024-12-20 NOTE — DISCHARGE INSTRUCTIONS

## 2024-12-20 NOTE — ANESTHESIA POSTPROCEDURE EVALUATION
"Patient: Annabel Quiñonez \"Leigh Ann\"    Procedure Summary       Date: 12/20/24 Room / Location: [facility]    Anesthesia Start: 0841 Anesthesia Stop: 0901    Procedure: COLONOSCOPY Diagnosis:       Screening for malignant neoplasm      Screening for malignant neoplasm    Scheduled Providers: Osbaldo Pedraza MD Responsible Provider: ELIDIA Gonzalez    Anesthesia Type: MAC ASA Status: 3            Anesthesia Type: MAC    Vitals Value Taken Time   /85 12/20/24 0859   Temp 36 °C (96.8 °F) 12/20/24 0859   Pulse 76 12/20/24 0859   Resp 21 12/20/24 0859   SpO2 98 % 12/20/24 0859       Anesthesia Post Evaluation    Patient location during evaluation: bedside  Patient participation: complete - patient participated  Level of consciousness: awake  Pain score: 0  Pain management: adequate  Airway patency: patent  Cardiovascular status: acceptable  Respiratory status: acceptable  Hydration status: acceptable  Postoperative Nausea and Vomiting: none    There were no known notable events for this encounter.    "

## 2024-12-20 NOTE — H&P
"History Of Present Illness  Annabel Quiñonez \"Srini" is a 49 y.o. female presenting with colon cancer screening     Past Medical History  Past Medical History:   Diagnosis Date    Cancer (Multi) 2020    GERD (gastroesophageal reflux disease)     Hyperlipidemia     Hypertension     Pancreatitis (HHS-HCC)     Type 2 diabetes mellitus      Surgical History  Past Surgical History:   Procedure Laterality Date    ANKLE SURGERY      Bone spur    APPENDECTOMY      BREAST RECONSTRUCTION Bilateral 2000 bilateral breast reduction     SECTION, LOW TRANSVERSE      HYSTERECTOMY  2015    Still has cervix and 1 ovary    SKIN CANCER EXCISION      Nose    TUBAL LIGATION  10/31/09    WRIST SURGERY       Social History  She reports that she has never smoked. She has never been exposed to tobacco smoke. She has never used smokeless tobacco. She reports that she does not drink alcohol and does not use drugs.    Family History  Family History   Problem Relation Name Age of Onset    GI problems Mother Victor M     Hypertension Mother Victor M     No Known Problems Father      Thyroid cancer Sister      Cancer Maternal Grandmother Amanda     Diabetes Maternal Grandmother Amanda         Allergies  Allergies   Allergen Reactions    Doxycycline Other    Hydromorphone Other    Oxycodone-Acetaminophen Other    Penicillins Other    Shrimp Hives    Sulfa (Sulfonamide Antibiotics) Other    Adhes. Band-Tape-Benzalkonium Rash    Other Itching and Rash     Hospital issued mesh boy-short panties    Stick Deodorant Rash     Review of Systems   Constitutional:  Negative for chills, fever and unexpected weight change.   HENT:  Negative for congestion and trouble swallowing.    Respiratory:  Negative for cough, shortness of breath and wheezing.    Cardiovascular:  Negative for chest pain.   Gastrointestinal:  Negative for abdominal distention, abdominal pain, constipation, diarrhea, nausea and vomiting.   Genitourinary:  Negative for " "difficulty urinating.   Musculoskeletal:  Negative for arthralgias and joint swelling.   Skin:  Negative for color change.   Neurological:  Negative for dizziness, speech difficulty, light-headedness and headaches.   Psychiatric/Behavioral:  Negative for confusion and sleep disturbance.         Physical Exam  Constitutional:       General: She is awake.      Appearance: Normal appearance.   HENT:      Head: Normocephalic and atraumatic.      Nose: Nose normal.      Mouth/Throat:      Mouth: Mucous membranes are moist.   Eyes:      Pupils: Pupils are equal, round, and reactive to light.   Neck:      Thyroid: No thyroid mass.      Trachea: Phonation normal.   Cardiovascular:      Rate and Rhythm: Normal rate and regular rhythm.      Heart sounds: Normal heart sounds. No murmur heard.     No gallop.   Pulmonary:      Effort: Pulmonary effort is normal. No respiratory distress.      Breath sounds: Normal air entry. No decreased breath sounds, wheezing, rhonchi or rales.   Abdominal:      General: Bowel sounds are normal. There is no distension.      Palpations: Abdomen is soft.      Tenderness: There is no abdominal tenderness.   Musculoskeletal:      Cervical back: Neck supple.      Right lower leg: No edema.      Left lower leg: No edema.   Skin:     General: Skin is warm.      Capillary Refill: Capillary refill takes less than 2 seconds.   Neurological:      General: No focal deficit present.      Mental Status: She is alert and oriented to person, place, and time. Mental status is at baseline.      Cranial Nerves: Cranial nerves 2-12 are intact.      Motor: Motor function is intact.   Psychiatric:         Attention and Perception: Attention and perception normal.         Mood and Affect: Mood normal.         Speech: Speech normal.         Behavior: Behavior normal.          Last Recorded Vitals  Blood pressure (!) 153/96, pulse 87, temperature 36 °C (96.8 °F), resp. rate 17, height 1.702 m (5' 7\"), weight 97.5 kg " (215 lb), SpO2 96%.    Assessment/Plan   Colon cancer screening  Colonoscopy      Osbaldo Pedraza MD

## 2024-12-20 NOTE — ANESTHESIA PREPROCEDURE EVALUATION
"Patient: Annabel Quiñonez \"Leigh Ann\"    Procedure Information       Date/Time: 12/20/24 0830    Scheduled providers: Osbaldo Pedraza MD    Procedure: COLONOSCOPY    Location: [facility]            Relevant Problems   Cardiac   (+) Benign essential hypertension   (+) Hyperlipidemia   (+) Hypertriglyceridemia      GI   (+) GERD (gastroesophageal reflux disease)      Hematology   (+) Anemia due to chronic blood loss      ID   (+) Genital herpes       Clinical information reviewed:   Tobacco   Meds   Med Hx  Surg Hx   Fam Hx  Soc Hx        NPO Detail:  No data recorded     Physical Exam    Airway  Mallampati: III  TM distance: >3 FB  Neck ROM: full     Cardiovascular - normal exam  Rhythm: regular  Rate: normal     Dental - normal exam     Pulmonary - normal exam  Breath sounds clear to auscultation     Abdominal - normal exam  (+) obese  Abdomen: soft         Anesthesia Plan    History of general anesthesia?: yes  History of complications of general anesthesia?: no    ASA 3     MAC     The patient is not a current smoker.    intravenous induction   Anesthetic plan and risks discussed with patient.    Plan discussed with CRNA.    "

## 2024-12-26 LAB
CYTOLOGY CMNT CVX/VAG CYTO-IMP: NORMAL
HPV HR 12 DNA GENITAL QL NAA+PROBE: NEGATIVE
HPV HR GENOTYPES PNL CVX NAA+PROBE: NEGATIVE
HPV16 DNA SPEC QL NAA+PROBE: NEGATIVE
HPV18 DNA SPEC QL NAA+PROBE: NEGATIVE
LAB AP HPV GENOTYPE QUESTION: YES
LAB AP HPV HR: NORMAL
LABORATORY COMMENT REPORT: NORMAL
PATH REPORT.TOTAL CANCER: NORMAL

## 2024-12-28 DIAGNOSIS — J45.20 MILD INTERMITTENT ASTHMA, UNSPECIFIED WHETHER COMPLICATED (HHS-HCC): ICD-10-CM

## 2024-12-30 RX ORDER — MONTELUKAST SODIUM 10 MG/1
10 TABLET ORAL NIGHTLY
Qty: 30 TABLET | Refills: 0 | Status: SHIPPED | OUTPATIENT
Start: 2024-12-30

## 2025-02-24 DIAGNOSIS — K21.9 GASTROESOPHAGEAL REFLUX DISEASE, UNSPECIFIED WHETHER ESOPHAGITIS PRESENT: ICD-10-CM

## 2025-02-24 RX ORDER — OMEPRAZOLE 40 MG/1
40 CAPSULE, DELAYED RELEASE ORAL DAILY
Qty: 30 CAPSULE | Refills: 0 | OUTPATIENT
Start: 2025-02-24

## 2025-02-24 RX ORDER — OMEPRAZOLE 40 MG/1
40 CAPSULE, DELAYED RELEASE ORAL DAILY
Qty: 90 CAPSULE | Refills: 0 | Status: SHIPPED | OUTPATIENT
Start: 2025-02-24 | End: 2025-05-25

## 2025-02-28 ENCOUNTER — HOSPITAL ENCOUNTER (OUTPATIENT)
Dept: RADIOLOGY | Facility: CLINIC | Age: 50
Discharge: HOME | End: 2025-02-28
Payer: COMMERCIAL

## 2025-02-28 ENCOUNTER — OFFICE VISIT (OUTPATIENT)
Dept: CARDIOLOGY | Facility: CLINIC | Age: 50
End: 2025-02-28
Payer: COMMERCIAL

## 2025-02-28 VITALS
SYSTOLIC BLOOD PRESSURE: 136 MMHG | WEIGHT: 219 LBS | OXYGEN SATURATION: 97 % | BODY MASS INDEX: 34.37 KG/M2 | HEIGHT: 67 IN | DIASTOLIC BLOOD PRESSURE: 92 MMHG | HEART RATE: 96 BPM

## 2025-02-28 DIAGNOSIS — E78.2 MIXED HYPERLIPIDEMIA: ICD-10-CM

## 2025-02-28 DIAGNOSIS — E78.2 MIXED HYPERLIPIDEMIA: Primary | ICD-10-CM

## 2025-02-28 PROCEDURE — 99204 OFFICE O/P NEW MOD 45 MIN: CPT | Performed by: INTERNAL MEDICINE

## 2025-02-28 PROCEDURE — 3008F BODY MASS INDEX DOCD: CPT | Performed by: INTERNAL MEDICINE

## 2025-02-28 PROCEDURE — 3075F SYST BP GE 130 - 139MM HG: CPT | Performed by: INTERNAL MEDICINE

## 2025-02-28 PROCEDURE — 99214 OFFICE O/P EST MOD 30 MIN: CPT | Performed by: INTERNAL MEDICINE

## 2025-02-28 PROCEDURE — 93005 ELECTROCARDIOGRAM TRACING: CPT | Performed by: INTERNAL MEDICINE

## 2025-02-28 PROCEDURE — 75571 CT HRT W/O DYE W/CA TEST: CPT

## 2025-02-28 PROCEDURE — 1036F TOBACCO NON-USER: CPT | Performed by: INTERNAL MEDICINE

## 2025-02-28 PROCEDURE — 3080F DIAST BP >= 90 MM HG: CPT | Performed by: INTERNAL MEDICINE

## 2025-02-28 NOTE — PROGRESS NOTES
Name : Annabel Quiñonez    : 1975   MRN : [id number]   ENC Date : 25     Reason for visit: Palpitations and dyslipidemia    Assessment and Plan:  Palpitations: Patient's description of her symptoms are quite classic for PACs and/or PVCs.  No sustained episodes of tachycardia.  I do not think she needs further testing.  If her symptoms persist we can reconsider Holter monitor.  I actually encouraged her to upgrade her smart watch to 1 that can do EKG tracings should she want to monitor her heart rhythm more closely.  This would be a more cost effective way of monitoring it over the next several years.  EKG is unremarkable.  Hypertriglyceridemia: Patient has extremely elevated triglyceride levels and has had 2 episodes of pancreatitis.  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  I encouraged her to have her kids checked as they are all in their teenage years or early 20s.  Agree with current medical therapy.  I recommended a CT cardiac calcium score.  If this is greater than 0 which I suspect it probably will be I would recommend adding statin therapy to her gemfibrozil and fish oil.  Combination therapy is associated with potentially more complications but can be done safely.  If her calcium score is greatly elevated we may consider stress testing and/or aspirin as well given the length of time that she has had severely elevated triglycerides.  Hypertension: Blood pressure reasonably well-controlled.  She is only on olmesartan even though amlodipine was listed in her medical record.  If blood pressure elevates over time certainly would be reasonable to add the amlodipine back.  Should her palpitations continue and her blood pressure is elevated you could make an argument for going to beta-blocker instead of amlodipine which could potentially treat both symptoms.  Disp: Phone follow-up with coronary calcium score.      HPI:  Patient is seen today for self-referral for palpitations.  She states that  twice in the last year she drank a can of Coke and developed some palpitations or what she described as air bubbles in her chest.  She described a skipping heartbeat that with the first episode only lasted for a few minutes but with the most recent episode is actually been going on for a couple of days.  She has no exertional chest pain.  No syncopal events.  No TIA or CVA-like symptoms.  Despite stating that she still felt the symptoms today her EKG shows perfectly normal sinus rhythm and her exam was unremarkable with a regular rhythm.  No family history of sudden cardiac death.  She does have a remarkable history with severely elevated triglycerides with 2 episodes of pancreatitis with her first episode occurring in her early 20s.  She has been intermittently compliant with gemfibrozil therapy but now she has been taking it regularly with recent fish oil addition.  I do not believe she is ever been on statin therapy.      Problem List:   Patient Active Problem List   Diagnosis    Insomnia    Hypertriglyceridemia    Hyperlipidemia    Status post hysterectomy with oophorectomy    History of tubal ligation    History of  section    GERD (gastroesophageal reflux disease)    Genital herpes    Prediabetes    Benign essential hypertension    Reactive airway disease (HHS-HCC)    Anemia due to chronic blood loss    Hematochezia    LAD (lymphadenopathy) of left cervical region        Meds:   Current Outpatient Medications on File Prior to Visit   Medication Sig Dispense Refill    acyclovir (Zovirax) 400 mg tablet Take 1 tablet (400 mg) by mouth 2 times a day. 30 tablet 0    gemfibrozil (Lopid) 600 mg tablet Take 1 tablet (600 mg) by mouth 2 times a day. 90 tablet 1    metFORMIN (Glucophage) 500 mg tablet Take 1 tablet (500 mg) by mouth once daily with breakfast. Take with food. 90 tablet 1    montelukast (Singulair) 10 mg tablet TAKE ONE TABLET BY MOUTH ONCE DAILY AT BEDTIME 30 tablet 0    olmesartan (BENIcar) 20  mg tablet Take 1 tablet (20 mg) by mouth once daily. 90 tablet 1    omeprazole (PriLOSEC) 40 mg DR capsule Take 1 capsule (40 mg) by mouth once daily. 90 capsule 0    [DISCONTINUED] amLODIPine (Norvasc) 5 mg tablet Take 1 tablet (5 mg) by mouth once daily. (Patient not taking: Reported on 2/28/2025) 30 tablet 0    [DISCONTINUED] fluconazole (Diflucan) 150 mg tablet Take 1 tablet, may take 2nd tablet 72hrs later if symptoms persist. (Patient not taking: Reported on 2/28/2025) 2 tablet 0    [DISCONTINUED] omeprazole (PriLOSEC) 40 mg DR capsule Take 1 capsule (40 mg) by mouth once daily. 30 capsule 3     No current facility-administered medications on file prior to visit.       All:   Allergies   Allergen Reactions    Doxycycline Other    Hydromorphone Other    Oxycodone-Acetaminophen Other    Penicillins Other    Shrimp Hives    Sulfa (Sulfonamide Antibiotics) Other    Adhes. Band-Tape-Benzalkonium Rash    Other Itching and Rash     Hospital issued mesh boy-short panties    Stick Deodorant Rash       Fam Hx:   Family History   Problem Relation Name Age of Onset    GI problems Mother Victor M     Hypertension Mother Victor M     No Known Problems Father      Thyroid cancer Sister      Cancer Maternal Grandmother Amanda     Diabetes Maternal Grandmother Amanda        Soc Hx:   Social History     Socioeconomic History    Marital status:      Spouse name: Not on file    Number of children: Not on file    Years of education: Not on file    Highest education level: Not on file   Occupational History    Not on file   Tobacco Use    Smoking status: Never     Passive exposure: Never    Smokeless tobacco: Never   Vaping Use    Vaping status: Never Used   Substance and Sexual Activity    Alcohol use: Never    Drug use: Never    Sexual activity: Yes     Partners: Male     Birth control/protection: Surgical   Other Topics Concern    Not on file   Social History Narrative    Not on file     Social Drivers of Health     Financial  "Resource Strain: Low Risk  (10/30/2024)    Overall Financial Resource Strain (CARDIA)     Difficulty of Paying Living Expenses: Not hard at all   Food Insecurity: No Food Insecurity (11/25/2024)    Hunger Vital Sign     Worried About Running Out of Food in the Last Year: Never true     Ran Out of Food in the Last Year: Never true   Transportation Needs: No Transportation Needs (10/30/2024)    PRAPARE - Transportation     Lack of Transportation (Medical): No     Lack of Transportation (Non-Medical): No   Physical Activity: Inactive (10/30/2024)    Exercise Vital Sign     Days of Exercise per Week: 0 days     Minutes of Exercise per Session: 0 min   Stress: Stress Concern Present (10/30/2024)    [place] [place] [employer] - Occupational Stress Questionnaire     Feeling of Stress : To some extent   Social Connections: Socially Integrated (10/30/2024)    Social Connection and Isolation Panel [NHANES]     Frequency of Communication with Friends and Family: More than three times a week     Frequency of Social Gatherings with Friends and Family: Once a week     Attends Buddhist Services: 1 to 4 times per year     Active Member of Clubs or Organizations: No     Attends Club or Organization Meetings: 1 to 4 times per year     Marital Status:    Intimate Partner Violence: Not At Risk (10/30/2024)    Humiliation, Afraid, Rape, and Kick questionnaire     Fear of Current or Ex-Partner: No     Emotionally Abused: No     Physically Abused: No     Sexually Abused: No   Housing Stability: Low Risk  (10/30/2024)    Housing Stability Vital Sign     Unable to Pay for Housing in the Last Year: No     Number of Times Moved in the Last Year: 0     Homeless in the Last Year: No       VS: BP (!) 136/92 (BP Location: Right arm, Patient Position: Sitting)   Pulse 96   Ht 1.702 m (5' 7\")   Wt 99.3 kg (219 lb)   SpO2 97%   BMI 34.30 kg/m²      Physical Exam  Vitals reviewed.   Constitutional:       Appearance: Normal " appearance.   Eyes:      Pupils: Pupils are equal, round, and reactive to light.   Neck:      Vascular: No JVD.   Cardiovascular:      Rate and Rhythm: Normal rate and regular rhythm.      Pulses: Normal pulses.      Heart sounds: No murmur heard.     No gallop.   Pulmonary:      Effort: No respiratory distress.      Breath sounds: No wheezing or rales.   Abdominal:      General: Abdomen is flat. There is no distension.      Palpations: Abdomen is soft.   Musculoskeletal:         General: No swelling.      Right lower leg: No edema.      Left lower leg: No edema.   Neurological:      General: No focal deficit present.      Mental Status: She is alert.   Psychiatric:         Mood and Affect: Mood normal.          ECG: Normal sinus rhythm.  Slow R wave progression otherwise unremarkable.      Eduardo Ortega MD

## 2025-03-03 ENCOUNTER — TELEPHONE (OUTPATIENT)
Dept: CARDIOLOGY | Facility: HOSPITAL | Age: 50
End: 2025-03-03
Payer: COMMERCIAL

## 2025-03-03 DIAGNOSIS — E78.1 HYPERTRIGLYCERIDEMIA: Chronic | ICD-10-CM

## 2025-03-03 RX ORDER — ROSUVASTATIN CALCIUM 10 MG/1
10 TABLET, COATED ORAL DAILY
Qty: 90 TABLET | Refills: 3 | Status: SHIPPED | OUTPATIENT
Start: 2025-03-03 | End: 2026-03-03

## 2025-03-03 NOTE — TELEPHONE ENCOUNTER
I called and informed patient. She stated she will call office back to schedule one year follow up/

## 2025-03-03 NOTE — TELEPHONE ENCOUNTER
Please let patient know great news.  Her coronary calcium score is very low at only 1.4.  This is outstanding especially for her level of hypertriglyceridemia.  I would recommend adding rosuvastatin 10 mg which is fairly low dose to her gemfibrozil.  This can be done safely and can help even further lower her triglycerides and prevent any further coronary calcification.  I do not think she needs aspirin or stress testing at this time.    If she is okay with that please pend the rosuvastatin Rx.  RTO with me in 1 year.    SDH

## 2025-03-05 LAB
ATRIAL RATE: 88 BPM
P AXIS: 21 DEGREES
P OFFSET: 208 MS
P ONSET: 150 MS
PR INTERVAL: 142 MS
Q ONSET: 221 MS
QRS COUNT: 15 BEATS
QRS DURATION: 76 MS
QT INTERVAL: 352 MS
QTC CALCULATION(BAZETT): 425 MS
QTC FREDERICIA: 400 MS
R AXIS: 19 DEGREES
T AXIS: 44 DEGREES
T OFFSET: 397 MS
VENTRICULAR RATE: 88 BPM

## 2025-05-24 DIAGNOSIS — I10 HYPERTENSION, UNSPECIFIED TYPE: ICD-10-CM

## 2025-05-24 DIAGNOSIS — E11.9 CONTROLLED TYPE 2 DIABETES MELLITUS WITHOUT COMPLICATION, WITHOUT LONG-TERM CURRENT USE OF INSULIN: ICD-10-CM

## 2025-05-24 DIAGNOSIS — K21.9 GASTROESOPHAGEAL REFLUX DISEASE, UNSPECIFIED WHETHER ESOPHAGITIS PRESENT: ICD-10-CM

## 2025-05-27 RX ORDER — OLMESARTAN MEDOXOMIL 20 MG/1
20 TABLET ORAL DAILY
Qty: 30 TABLET | Refills: 0 | Status: SHIPPED | OUTPATIENT
Start: 2025-05-27

## 2025-05-27 RX ORDER — OMEPRAZOLE 40 MG/1
40 CAPSULE, DELAYED RELEASE ORAL DAILY
Qty: 90 CAPSULE | Refills: 0 | Status: SHIPPED | OUTPATIENT
Start: 2025-05-27

## 2025-05-27 RX ORDER — METFORMIN HYDROCHLORIDE 500 MG/1
TABLET ORAL
Qty: 30 TABLET | Refills: 0 | Status: SHIPPED | OUTPATIENT
Start: 2025-05-27

## 2025-06-20 ENCOUNTER — OFFICE VISIT (OUTPATIENT)
Dept: ORTHOPEDIC SURGERY | Facility: CLINIC | Age: 50
End: 2025-06-20
Payer: COMMERCIAL

## 2025-06-20 ENCOUNTER — HOSPITAL ENCOUNTER (OUTPATIENT)
Dept: RADIOLOGY | Facility: CLINIC | Age: 50
Discharge: HOME | End: 2025-06-20
Payer: COMMERCIAL

## 2025-06-20 ENCOUNTER — OFFICE VISIT (OUTPATIENT)
Dept: PRIMARY CARE | Facility: CLINIC | Age: 50
End: 2025-06-20
Payer: COMMERCIAL

## 2025-06-20 VITALS
DIASTOLIC BLOOD PRESSURE: 82 MMHG | OXYGEN SATURATION: 98 % | BODY MASS INDEX: 33.36 KG/M2 | HEART RATE: 92 BPM | TEMPERATURE: 96.5 F | SYSTOLIC BLOOD PRESSURE: 138 MMHG | WEIGHT: 213 LBS

## 2025-06-20 VITALS — BODY MASS INDEX: 17.74 KG/M2 | WEIGHT: 113 LBS | HEIGHT: 67 IN

## 2025-06-20 DIAGNOSIS — M79.621 PAIN IN RIGHT UPPER ARM: ICD-10-CM

## 2025-06-20 DIAGNOSIS — S50.01XA CONTUSION OF RIGHT ELBOW, INITIAL ENCOUNTER: ICD-10-CM

## 2025-06-20 DIAGNOSIS — S50.01XA HEMATOMA OF RIGHT ELBOW: Primary | ICD-10-CM

## 2025-06-20 DIAGNOSIS — S40.011A CONTUSION OF RIGHT SHOULDER, INITIAL ENCOUNTER: ICD-10-CM

## 2025-06-20 DIAGNOSIS — S43.401A SPRAIN OF RIGHT SHOULDER, INITIAL ENCOUNTER: ICD-10-CM

## 2025-06-20 PROCEDURE — 73080 X-RAY EXAM OF ELBOW: CPT | Mod: RT

## 2025-06-20 PROCEDURE — 73030 X-RAY EXAM OF SHOULDER: CPT | Mod: RT

## 2025-06-20 PROCEDURE — 99203 OFFICE O/P NEW LOW 30 MIN: CPT | Performed by: FAMILY MEDICINE

## 2025-06-20 ASSESSMENT — PATIENT HEALTH QUESTIONNAIRE - PHQ9
SUM OF ALL RESPONSES TO PHQ9 QUESTIONS 1 AND 2: 0
2. FEELING DOWN, DEPRESSED OR HOPELESS: NOT AT ALL
1. LITTLE INTEREST OR PLEASURE IN DOING THINGS: NOT AT ALL

## 2025-06-20 NOTE — PATIENT INSTRUCTIONS
VISIT SUMMARY:  You visited us today due to pain in your right elbow and shoulder following a fall. We discussed your symptoms, performed a physical examination, and reviewed your X-rays.    YOUR PLAN:  -RIGHT SHOULDER SPRAIN: A shoulder sprain involves stretching or tearing of the ligaments in the shoulder. Your X-rays were normal, and you have a full range of motion with some discomfort. We recommend using a sling for support during downtime, performing shoulder exercises to maintain range of motion, applying ice to reduce swelling, and alternating acetaminophen and ibuprofen for pain management.    -RIGHT ELBOW CONTUSION AND HEMATOMA: A contusion is a bruise, and a hematoma is a collection of blood outside of blood vessels. Your elbow has significant swelling and bruising, but you have normal movement and strength. We recommend using an elbow compression bandage or ACE wrap for daytime support, removing it at night, applying ice to reduce swelling, and alternating acetaminophen and ibuprofen for pain management. We plan to repeat X-rays in two weeks to check for healing, and you should return earlier if your symptoms worsen or become intolerable.    INSTRUCTIONS:  Please follow up in two weeks for repeat X-rays of your right elbow. Return earlier if your symptoms worsen or become intolerable.

## 2025-06-21 NOTE — PROGRESS NOTES
Acute Injury New Patient Visit  Assessment & Plan  Right shoulder sprain  Right shoulder sprain with pain on movement, especially overhead and across the body. X-rays normal. Full range of motion with lateral discomfort. No rotator cuff tear or significant injury. Negative nearest [place] and [place]'s tests. No AC joint or distal clavicle pain. Lateral soft tissue discomfort.  - Provide sling for support during downtime.  - Recommend shoulder exercises to maintain range of motion.  - Advise ice application to reduce swelling and discomfort.  - Suggest alternating acetaminophen and ibuprofen for pain management.    Right elbow contusion and hematoma  Right elbow contusion and hematoma with significant swelling and bruising laterally. Full flexion and extension. Normal  strength, pronation, and supination. No laxity with valgus stress. X-rays normal, but small hairline fracture not ruled out. Suspected deep bruise. Discussed potential hairline fracture and decision to avoid casting unless symptoms worsen.  - Provide elbow compression bandage or ACE wrap for daytime support, remove at night.  - Recommend ice application to reduce swelling.  - Suggest alternating acetaminophen and ibuprofen for pain management.  - Plan repeat x-rays in two weeks to assess healing.  - Instruct to return earlier if symptoms worsen or become intolerable.    Orders Placed This Encounter    Sling    XR shoulder right 2+ views    XR elbow right 3+ views     Procedures   At the conclusion of the visit there were no further questions by the patient/family regarding their plan of care.  Patient was instructed to call or return with any issues, questions, or concerns regarding their injury and/or treatment plan described above.    PHYSICAL EXAM:  General:  Patient is awake, alert, and oriented to person place and time.  Patient appears well nourished and well kept.  Affect Calm, Not Acutely Distressed.  Heent:  Normocephalic, Atraumatic,  EOMI  Cardiovascular:  Hemodynamically stable.  Respiratory:  Normal respirations with unlabored breathing.  Neuro: Gross sensation intact to the upper extremities bilaterally.  Extremity: Right upper extremity exam:  Physical Exam  MUSCULOSKELETAL: Full range of motion in right elbow and shoulder with forward flexion and lateral abduction. No tenderness to palpation of triceps or biceps. Negative [place]'s, [place], and [place]'s tests. No pain at AC joint, distal clavicle, olecranon, or medial epicondyle. Soft tissue discomfort laterally in right elbow.  strength equal and symmetric. Normal pronation and supination in right elbow. No laxity with valgus stress in right elbow. Significant soft tissue swelling and bruising over lateral side of right elbow.    IMAGING:   Results  RADIOLOGY  Right elbow x-ray: Significant soft tissue swelling over the lateral side, no deep swelling, no tenderness to palpation of triceps or biceps, normal pronation and supination, no laxity with valgus stress, no pain at olecranon or medial epicondyle. (06/19/2025)  Right shoulder x-ray: No abnormalities, soft tissue discomfort most pronounced laterally. (06/19/2025)  XR shoulder right 2+ views  Narrative: Interpreted By:  Budinsky, Cole,   STUDY:  XR SHOULDER RIGHT 2+ VIEWS; ;  6/20/2025 1:04 pm      INDICATION:  Signs/Symptoms:Pain.      ACCESSION NUMBER(S):  [id number]      ORDERING CLINICIAN:  COLE BUDINSKY      Impression: Three views right shoulder demonstrate no presence for acute fracture  or dislocation. Mild chronic degenerative changes seen about the AC  joint. Well-preserved spacing and alignment is seen.          Signed by: Cole Budinsky 6/20/2025 5:18 PM  Dictation workstation:   [id number]  XR elbow right 3+ views  Narrative: Interpreted By:  Budinsky, Cole,   STUDY:  XR ELBOW RIGHT 3+ VIEWS; ;  6/20/2025 1:04 pm      INDICATION:  Signs/Symptoms:Pain.      ACCESSION NUMBER(S):  [id number]      ORDERING  "CLINICIAN:  COLE BUDINSKY      Impression: Three views right elbow demonstrate no obvious presence for acute  fracture or dislocation. No elevated anterior or posterior fat pad.  Presence of spurring off the lateral epicondyle noted likely  consistent with chronic insertional tendinopathy. Also noted is a  well corticated density off the medial epicondyle which may be  consistent with previous or prior avulsion injury. No evidence to  suggest any obvious acute fracture or bony abnormality.          Signed by: Cole Budinsky 6/20/2025 5:16 PM  Dictation workstation:   [id number]      Patient ID: Annabel Beatty" is a 49 y.o. female who presents for Pain and New Patient Visit of the Right Shoulder (Fall 6/19/2025 - X-Rays Today) and Pain and New Patient Visit of the Right Elbow.  History of Present Illness  Annabel Beatty" is a 49 year old female who presents with right elbow and shoulder pain following a fall.    She fell on her right elbow, shoulder, hip, and stomach. The elbow is sore with a sensation of 'ripping' when moved and initially presented with swelling of a jelly-like consistency. No prior injury to the elbow is noted, though she recalls a childhood arm fracture. No pain in the elbow when pressed or moved, but soreness is present in a specific area. No pain when twisting or gripping.    Regarding the shoulder, she experiences pain when raising her arm overhead and moving it across her body. The shoulder feels bruised, but she maintains the ability to move it with some discomfort.    She has a history of sciatic nerve problems affecting her leg, present for a week, causing her to move slowly. She is currently taking Motrin for hip pain and has used it for her elbow as well.        This medical note was created with the assistance of artificial intelligence (AI) for documentation purposes. The content has been reviewed and confirmed by the healthcare provider for accuracy and completeness. " Patient consented to the use of audio recording and use of AI during their visit.   06/20/25 at 8:46 PM - Cole C Budinsky, MD  Office:  [phone]

## 2025-06-23 ENCOUNTER — OFFICE VISIT (OUTPATIENT)
Dept: ORTHOPEDIC SURGERY | Facility: CLINIC | Age: 50
End: 2025-06-23
Payer: COMMERCIAL

## 2025-06-23 ENCOUNTER — APPOINTMENT (OUTPATIENT)
Dept: SPORTS MEDICINE | Facility: HOSPITAL | Age: 50
End: 2025-06-23
Payer: COMMERCIAL

## 2025-06-23 DIAGNOSIS — S50.01XA CONTUSION OF RIGHT ELBOW, INITIAL ENCOUNTER: ICD-10-CM

## 2025-06-23 PROCEDURE — 99212 OFFICE O/P EST SF 10 MIN: CPT | Performed by: FAMILY MEDICINE

## 2025-06-23 PROCEDURE — 1036F TOBACCO NON-USER: CPT | Performed by: FAMILY MEDICINE

## 2025-06-23 PROCEDURE — 99214 OFFICE O/P EST MOD 30 MIN: CPT | Performed by: FAMILY MEDICINE

## 2025-06-23 RX ORDER — METHYLPREDNISOLONE 4 MG/1
TABLET ORAL
Qty: 1 EACH | Refills: 0 | Status: SHIPPED | OUTPATIENT
Start: 2025-06-23

## 2025-06-23 RX ORDER — CYCLOBENZAPRINE HCL 5 MG
5 TABLET ORAL NIGHTLY
Qty: 14 TABLET | Refills: 0 | Status: SHIPPED | OUTPATIENT
Start: 2025-06-23 | End: 2025-07-07

## 2025-06-23 NOTE — PATIENT INSTRUCTIONS
VISIT SUMMARY:  Today, you were seen for pain in your right shoulder and elbow. You have been experiencing significant pain and bruising in your shoulder, especially when pressure is applied. Your elbow is also sore, particularly when bumped, and you have been icing it over the weekend. You have a history of a wrist fracture but no previous elbow injuries. 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 You are concerned about your ability to work due to the pain.    YOUR PLAN:  -RIGHT SHOULDER SPRAIN: A right shoulder sprain involves injury to the ligaments in your shoulder. You have tenderness in the AC joint and bruising that may worsen before it gets better. The x-ray did not show any significant issues. You should use a sling for support, ice your shoulder to reduce swelling, and repeat shoulder x-rays in two weeks. If the pain worsens or does not improve in two weeks, we may consider an MRI.    -RIGHT ELBOW CONTUSION: A right elbow contusion is a bruise with swelling and tenderness in the soft tissue. There is a possibility of a small fracture, but the x-ray did not show any significant bone issues. You should apply a removable posterior elbow splint, ice your elbow to reduce swelling, and you have a two-week work note for light duty with no use of your right arm. We will repeat elbow x-rays in two weeks and may consider casting if the splint is not sufficient.    INSTRUCTIONS:  Please follow up in two weeks for repeat x-rays of your shoulder and elbow. If your pain worsens or does not improve, contact our office for further evaluation. You have a work note for light duty for the next two weeks, avoiding the use of your right arm.

## 2025-06-23 NOTE — PROGRESS NOTES
Follow-Up Patient Visit: Upper Extremity Injury  Assessment & Plan  Right shoulder sprain  Right shoulder sprain with AC joint tenderness and possible ligament sprain. Bruising expected to worsen before improving. X-ray showed no significant abnormality.  - Use sling for support.  - Ice shoulder to reduce swelling.  - Repeat shoulder x-rays in two weeks.  - Consider MRI if pain worsens or persists beyond two weeks.    Right elbow contusion  Right elbow contusion with soft tissue swelling and tenderness. Possible small radial head fracture. X-ray showed no significant bony abnormality.  - Apply removable posterior elbow splint.  - Ice elbow to reduce swelling.  - Provide two-week work note for light duty, no use of right arm.  - Repeat elbow x-rays in two weeks.  - Consider casting if splint insufficient.    Low back pain versus sciatica  Patient also describes some discomfort just prior to her discharge from the office today.  We can further evaluate this if necessary going forward.  Will offer her an oral steroid to help with the swelling and inflammation laterally in the shoulder elbow but also in her low back.  Will also provide a muscle relaxer for and give her some home exercises.  Hold off on further advanced imaging for now if the low back continues to be  bothering her, we will obtain 4 views of the lumbar spine at follow-up.    Orders Placed This Encounter    methylPREDNISolone (Medrol Dospak) 4 mg tablets    cyclobenzaprine (Flexeril) 5 mg tablet     1. Contusion of right elbow, initial encounter      Procedures  At the conclusion of the visit there were no further questions by the patient/family regarding their plan of care.  Patient was instructed to call or return with any issues, questions, or concerns regarding their injury and/or treatment plan described above.    PHYSICAL EXAM:  Neuro: Gross sensation intact to the upper extremities bilaterally.  Upper Extremity: Gross sensation intact to the  upper extremities bilaterally.  Physical Exam  MUSCULOSKELETAL: Right elbow with diffuse soft tissue swelling and fullness. Tender palpation over bruise at posterior lateral aspect. Olecranon and medial epicondyle non-tender. Minimal pain over lateral epicondyle. Mild discomfort over radial head. Good pronation and supination with minimal discomfort. Full extension and flexion of elbow without significant pain. Right shoulder with tender palpation at AC joint. No crepitus at distal clavicle.    IMAGING:   Results  Procedure: Removable posterior elbow splint application  Description: Right elbow demonstrates diffuse soft tissue swelling and fullness. Tender palpation over the bruise, most noted between the posterior lateral aspect of the soft tissue of the elbow between the radial head and olecranon. Olecranon and medial epicondyle are non-tender. Minimal pain over the lateral epicondyle. Mild discomfort over the radial head. Good pronation and supination with minimal discomfort. Full extension and flexion of the elbow without significant pain. Right shoulder demonstrates tender palpation at the AC joint. No obvious crepitus noted at the distal clavicle. Forearm compartment is otherwise soft and compressible. Gentle grasp is possible.    RADIOLOGY  Right elbow x-ray: Diffuse soft tissue swelling and fullness. (06/16/2025)  Right shoulder x-ray: No obvious crepitus noted at the distal clavicle. (06/16/2025)  XR shoulder right 2+ views  Narrative: Interpreted By:  Budinsky, Cole,   STUDY:  XR SHOULDER RIGHT 2+ VIEWS; ;  6/20/2025 1:04 pm      INDICATION:  Signs/Symptoms:Pain.      ACCESSION NUMBER(S):  [id number]      ORDERING CLINICIAN:  COLE BUDINSKY      Impression: Three views right shoulder demonstrate no presence for acute fracture  or dislocation. Mild chronic degenerative changes seen about the AC  joint. Well-preserved spacing and alignment is seen.          Signed by: Cole Budinsky 6/20/2025 5:18  "PM  Dictation workstation:   [id number]  XR elbow right 3+ views  Narrative: Interpreted By:  Budinsky, Cole,   STUDY:  XR ELBOW RIGHT 3+ VIEWS; ;  6/20/2025 1:04 pm      INDICATION:  Signs/Symptoms:Pain.      ACCESSION NUMBER(S):  [id number]      ORDERING CLINICIAN:  COLE BUDINSKY      Impression: Three views right elbow demonstrate no obvious presence for acute  fracture or dislocation. No elevated anterior or posterior fat pad.  Presence of spurring off the lateral epicondyle noted likely  consistent with chronic insertional tendinopathy. Also noted is a  well corticated density off the medial epicondyle which may be  consistent with previous or prior avulsion injury. No evidence to  suggest any obvious acute fracture or bony abnormality.          Signed by: Cole Budinsky 6/20/2025 5:16 PM  Dictation workstation:   [id number]       [facility]  Patient ID: Annabel Beatty" is a 49 y.o. female who presents for Pain of the Right Shoulder and Pain of the Right Elbow.  History of Present Illness  Annabel Beatty" is a 49 year old female who presents with right shoulder and elbow pain.    She returns for further evaluation of her right shoulder pain, which is accompanied by progressive bruising. The pain is significant, especially when pressure is applied, such as when wearing a bra. The soreness is particularly pronounced at the tip of the shoulder, and the bruising appears to worsen before improving.    Regarding her right elbow, she describes significant soreness, especially when bumped, causing severe pain. The pain is localized around the bruise on her elbow. She has been icing the area over the weekend. She can fully extend and flex the elbow with minimal discomfort but experiences pain when pressure is applied to the bruised area.    She has a history of a wrist fracture at age [age] but does not recall any previous elbow injuries. 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 She is concerned about her ability to work due to the pain and is considering taking time off to allow for healing.            This medical note was created with the assistance of artificial intelligence (AI) for documentation purposes. The content has been reviewed and confirmed by the healthcare provider for accuracy and completeness. Patient consented to the use of audio recording and use of AI during their visit.   06/23/25 at 6:46 PM - Cole C Budinsky, MD  Office:  [phone]

## 2025-06-28 DIAGNOSIS — I10 HYPERTENSION, UNSPECIFIED TYPE: ICD-10-CM

## 2025-06-28 DIAGNOSIS — E11.9 CONTROLLED TYPE 2 DIABETES MELLITUS WITHOUT COMPLICATION, WITHOUT LONG-TERM CURRENT USE OF INSULIN: ICD-10-CM

## 2025-06-30 RX ORDER — OLMESARTAN MEDOXOMIL 20 MG/1
20 TABLET ORAL DAILY
Qty: 90 TABLET | Refills: 0 | Status: SHIPPED | OUTPATIENT
Start: 2025-06-30

## 2025-06-30 RX ORDER — METFORMIN HYDROCHLORIDE 500 MG/1
TABLET ORAL
Qty: 90 TABLET | Refills: 0 | Status: SHIPPED | OUTPATIENT
Start: 2025-06-30

## 2025-07-11 ENCOUNTER — HOSPITAL ENCOUNTER (OUTPATIENT)
Dept: RADIOLOGY | Facility: CLINIC | Age: 50
Discharge: HOME | End: 2025-07-11
Payer: COMMERCIAL

## 2025-07-11 ENCOUNTER — APPOINTMENT (OUTPATIENT)
Dept: ORTHOPEDIC SURGERY | Facility: CLINIC | Age: 50
End: 2025-07-11
Payer: COMMERCIAL

## 2025-07-11 DIAGNOSIS — S40.011A CONTUSION OF RIGHT SHOULDER, INITIAL ENCOUNTER: ICD-10-CM

## 2025-07-11 DIAGNOSIS — S50.01XA CONTUSION OF RIGHT ELBOW, INITIAL ENCOUNTER: ICD-10-CM

## 2025-07-11 PROCEDURE — 99212 OFFICE O/P EST SF 10 MIN: CPT | Performed by: FAMILY MEDICINE

## 2025-07-11 PROCEDURE — L3761 EO, ADJ LOCK JOINT PREFAB OT: HCPCS | Performed by: FAMILY MEDICINE

## 2025-07-11 PROCEDURE — 73080 X-RAY EXAM OF ELBOW: CPT | Mod: RT

## 2025-07-11 PROCEDURE — 73030 X-RAY EXAM OF SHOULDER: CPT | Mod: RT

## 2025-07-11 RX ORDER — CYCLOBENZAPRINE HCL 10 MG
10 TABLET ORAL NIGHTLY PRN
Qty: 14 TABLET | Refills: 0 | Status: SHIPPED | OUTPATIENT
Start: 2025-07-11 | End: 2025-07-25

## 2025-07-11 NOTE — PROGRESS NOTES
Follow-Up Patient Visit: Upper Extremity Injury  Assessment & Plan  Right shoulder sprain  Persistent anterior biceps tendon pain with full range of motion but stiffness. Differential includes rotator cuff or labral tear. No imaging yet.  - Continue sling use for comfort during activity.  - Initiate physical therapy for shoulder strengthening.  - Consider MRI if no improvement with therapy.  - Use ice or heat for pain management.    Right elbow contusion  Contusion with bruising and swelling on radial side. No fracture on x-ray. Pain at radial head and lateral epicondyle. Limited extension, normal flexion. Possible bone spurs and arthritis from past injuries or overuse.  - Provide T-scope elbow brace locked at ninety degrees.  - Allow brace removal for rest and therapy.  - Initiate physical therapy for elbow.  - Use ice or heat for pain management.  - Refill Flexeril for muscle relaxation.    Orders Placed This Encounter    T-Scope Elbow    XR shoulder right 2+ views    XR elbow right 3+ views    Referral to Physical Therapy    cyclobenzaprine (Flexeril) 10 mg tablet     1. Contusion of right elbow, initial encounter    2. Contusion of right shoulder, initial encounter      Procedures  At the conclusion of the visit there were no further questions by the patient/family regarding their plan of care.  Patient was instructed to call or return with any issues, questions, or concerns regarding their injury and/or treatment plan described above.    PHYSICAL EXAM:  Neuro: Gross sensation intact to the upper extremities bilaterally.  Upper Extremity: Right right elbow and shoulder exam:  Physical Exam  MUSCULOSKELETAL: Right elbow with bruising and swelling on the radial side of the forearm. Ulnar side of right elbow non-tender. No pain at the olecranon. Radial head and lateral epicondyle of right elbow with mild pain. Right elbow extension lacking 3 degrees, flexion normal. No obvious laxity with stress. Compartment soft  "and compressible. Right shoulder with full range of motion but some stiffness. Forward flexion 135 degrees, abduction 90 degrees. Limited internal rotation. Tenderness over anterior biceps tendon and lateral aspect of right shoulder. No significant pain over AC joint.  strength equal and symmetric bilaterally.    IMAGING:   Results  RADIOLOGY  Elbow X-ray: No fracture, mild soft tissue swelling, no significant joint effusion, presence of osteophytes, no displaced fracture or loose bodies (07/11/2025)  Shoulder X-ray: No acute changes, no bony abnormalities (07/11/2025)  XR shoulder right 2+ views  Narrative: Interpreted By:  Budinsky, Cole,   STUDY:  XR SHOULDER RIGHT 2+ VIEWS; ;  7/11/2025 2:07 pm      INDICATION:  Signs/Symptoms:pain.      ACCESSION NUMBER(S):  [id number]      ORDERING CLINICIAN:  COLE BUDINSKY      Impression: Three views right shoulder demonstrate no presence for acute fracture  or dislocation. Stable appearing mild degenerative changes about the  AC joint. Glenohumeral joint remains well-positioned. Overall  impression stable unremarkable appearing right shoulder.          Signed by: Cole Budinsky 7/11/2025 5:19 PM  Dictation workstation:   [id number]  XR elbow right 3+ views  Narrative: Interpreted By:  Budinsky, Cole,   STUDY:  XR ELBOW RIGHT 3+ VIEWS; ;  7/11/2025 2:07 pm      INDICATION:  Signs/Symptoms:pain.      ACCESSION NUMBER(S):  [id number]      ORDERING CLINICIAN:  COLE BUDINSKY      Impression: Repeat three views right elbow demonstrate no obvious presence for  fracture or dislocation. Chronic enthesophytes seen off the medial  and lateral epicondyle. No presence for loose or intra-articular  body. No obvious anterior fat pad elevation.          Signed by: Cole Budinsky 7/11/2025 5:17 PM  Dictation workstation:   [id number]       [facility]  Patient ID: Annabel Quiñonez \"Srini" is a 49 y.o. female who presents for Follow-up of the Right Shoulder (Xrays today ) and Follow-up of the " "Right Elbow (xrays today).  History of Present Illness  Annabel Quiñonez \"Srini" is a 49 year old female who presents with persistent shoulder and elbow pain following a fall.    She has ongoing soreness in her shoulder and elbow following a fall. The shoulder pain is described as very sore, particularly in one specific area, likely due to a significant bruise from the fall. The pain has persisted since the original injury, and there is no significant pain over the AC joint.    Severe pain in the elbow was exacerbated when she accidentally banged it without her cast on, causing intense pain. The elbow feels more comfortable when supported in a sling but hurts when it dangles freely. She has been using a pad on the cast to alleviate discomfort. The elbow pain is located on the radial side and is accompanied by swelling. She has been using a splint, which she believes may have cracked, causing additional discomfort.    The sequence of events leading to her injuries involved a fall where she went 'up in the air and came down,' resulting in bruises on her breast and hip, and pain in her shoulder and elbow. She is unsure of the exact mechanism of injury but recalls hitting the wall during the fall.    She has been using a muscle relaxer, which has helped calm down her leg pain. She has not returned to work since the injury. 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            This medical note was created with the assistance of artificial intelligence (AI) for documentation purposes. The content has been reviewed and confirmed by the healthcare provider for accuracy and completeness. Patient consented to the use of audio recording and use of AI during their visit.   07/12/25 at 4:52 PM - Cole C Budinsky, MD  Office:  [phone]  "

## 2025-07-21 ENCOUNTER — APPOINTMENT (OUTPATIENT)
Dept: ORTHOPEDIC SURGERY | Facility: CLINIC | Age: 50
End: 2025-07-21
Payer: COMMERCIAL

## 2025-08-06 ENCOUNTER — OFFICE VISIT (OUTPATIENT)
Dept: ORTHOPEDIC SURGERY | Facility: CLINIC | Age: 50
End: 2025-08-06
Payer: COMMERCIAL

## 2025-08-06 DIAGNOSIS — S43.401A SPRAIN OF RIGHT SHOULDER, INITIAL ENCOUNTER: ICD-10-CM

## 2025-08-06 DIAGNOSIS — S50.01XA CONTUSION OF RIGHT ELBOW, INITIAL ENCOUNTER: Primary | ICD-10-CM

## 2025-08-06 DIAGNOSIS — S40.011A CONTUSION OF RIGHT SHOULDER, INITIAL ENCOUNTER: ICD-10-CM

## 2025-08-06 PROCEDURE — 99212 OFFICE O/P EST SF 10 MIN: CPT | Performed by: FAMILY MEDICINE

## 2025-08-06 PROCEDURE — 99213 OFFICE O/P EST LOW 20 MIN: CPT | Performed by: FAMILY MEDICINE

## 2025-08-06 PROCEDURE — 1036F TOBACCO NON-USER: CPT | Performed by: FAMILY MEDICINE

## 2025-08-06 NOTE — LETTER
August 6, 2025     Patient: Annabel Quiñonez   YOB: 1975   Date of Visit: 8/6/2025       To Whom It May Concern:    It is my medical opinion that Annabel Quiñonez may return to full duty with no restrictions on 8/18/2025.    If you have any questions or concerns, please don't hesitate to call.         Sincerely,        Cole C Budinsky, MD    CC: No Recipients

## 2025-08-06 NOTE — PROGRESS NOTES
"  Follow-Up Patient Visit: Upper Extremity Injury  Assessment & Plan  Right shoulder sprain  Right shoulder sprain with minimal soft tissue discomfort. Full range of motion and strength. Negative nearest Campbell and Grady's test.  - Encourage continued use of the brace as needed.  - Advise to start coming out of the sling as much as possible.  - Continue physical therapy exercises.  - Consider injection if symptoms plateau and do not improve.    Right elbow contusion  Right elbow contusion with minimal discomfort over the lateral epicondyle. Full range of motion with flexion, extension, pronation, and supination. No significant tenderness.  - Unlock the elbow brace to allow full motion while providing protection during activities.  - Advise wearing the brace during activities and when on feet, but not during rest or sleep.  No orders of the defined types were placed in this encounter.    1. Contusion of right elbow, initial encounter    2. Contusion of right shoulder, initial encounter    3. Sprain of right shoulder, initial encounter      Procedures  At the conclusion of the visit there were no further questions by the patient/family regarding their plan of care.  Patient was instructed to call or return with any issues, questions, or concerns regarding their injury and/or treatment plan described above.    PHYSICAL EXAM:  Neuro: Gross sensation intact to the upper extremities bilaterally.  Upper Extremity: Right shoulder and elbow exam:  Physical Exam  MUSCULOSKELETAL: Right elbow with bruising, minimal discomfort over lateral epicondyle, no olecranon or medial epicondyle pain, soft tissues non-tender, full flexion and extension, pronation and supination intact. Right shoulder with minimal soft tissue discomfort, full range of motion and strength, negative Neer's, Sheehan, and Grady's tests.    IMAGING:   Results    No new imaging today    HPI  Patient ID: Annabel Quiñonez \"Srini" is a 49 y.o. female who presents " "for Follow-up of the Right Shoulder (contusion) and Follow-up of the Right Elbow (contusion).  History of Present Illness  Annabel Quiñonez \"Srini" is a 49 year old female who presents for follow-up of her right shoulder and elbow injuries.    Her right elbow is improving, but her shoulder remains slightly sore, particularly in a specific area. There is noted improvement with the use of a brace, which she wears daily to help with tension and prevent further injury.    She has been engaging in physical therapy exercises, such as 'wiping the walls,' which she finds beneficial. Although some soreness is noted during shoulder movements, the patient reports being able to perform activities such as 'wiping the walls' as part of her physical therapy.    She has not returned to work as an  since the injury but is considering returning in two weeks. She plans to use the brace as a precautionary measure during activities and chores to protect her elbow and shoulder.    No pain is reported in the back of the shoulder or down the arm. There is no pain during twisting motions of the elbow, and she is able to fully flex and extend the elbow without discomfort. She also denies any pain in the medial epicondyle or olecranon areas.            This medical note was created with the assistance of artificial intelligence (AI) for documentation purposes. The content has been reviewed and confirmed by the healthcare provider for accuracy and completeness. Patient consented to the use of audio recording and use of AI during their visit.   08/06/25 at 1:45 PM - Cole C Budinsky, MD  Office:  [phone]  "

## 2025-09-04 DIAGNOSIS — K21.9 GASTROESOPHAGEAL REFLUX DISEASE, UNSPECIFIED WHETHER ESOPHAGITIS PRESENT: ICD-10-CM

## 2025-09-04 RX ORDER — OMEPRAZOLE 40 MG/1
40 CAPSULE, DELAYED RELEASE ORAL DAILY
Qty: 90 CAPSULE | Refills: 0 | Status: SHIPPED | OUTPATIENT
Start: 2025-09-04

## 2025-09-17 ENCOUNTER — APPOINTMENT (OUTPATIENT)
Dept: ORTHOPEDIC SURGERY | Facility: CLINIC | Age: 50
End: 2025-09-17
Payer: COMMERCIAL

## (undated) DEVICE — STAY SET, SURGICAL , 5MM SHARP HOOK, CS/ 50

## (undated) DEVICE — SUTURE, VICRYL, 3-0, 18 IN, PS2, UNDYED

## (undated) DEVICE — STAPLER, SKIN, PLUS, WIDE, 35

## (undated) DEVICE — SPONGE, HEMOSTAT, SURGICEL FIBRILLAR, ABS, 4 X 4, LF

## (undated) DEVICE — SUTURE, VICRYL, 3-0,18 IN, SH, UNDYED

## (undated) DEVICE — GLOVE, SURGICAL, PROTEXIS PI , 7.5, PF, LF

## (undated) DEVICE — Device

## (undated) DEVICE — SUTURE, MONOCRYLIC, 4-0, P3, MONO 18

## (undated) DEVICE — SUTURE, SILK, 3-0, 30 IN, MULTIPACK, BLACK

## (undated) DEVICE — SUTURE, PLAIN, 5-0, 18 IN, PC1, YELLOW

## (undated) DEVICE — CONTAINER, SPECIMEN, 4 OZ, OR PEEL PACK, STERILE

## (undated) DEVICE — SUTURE, SILK, 2-0, TIES, 12-30 IN, BLACK

## (undated) DEVICE — TOWEL PACK, STERILE, 4/PACK, BLUE

## (undated) DEVICE — SOLUTION, IRRIGATION, STERILE WATER, 1000 ML, POUR BOTTLE

## (undated) DEVICE — PREP TRAY, VAGINAL

## (undated) DEVICE — NEEDLE, ELECTRODE, ELECTROSURGICAL, INSULATED

## (undated) DEVICE — SOLUTION, IRRIGATION, SODIUM CHLORIDE 0.9%, 1000 ML, POUR BOTTLE